# Patient Record
Sex: FEMALE | Race: WHITE | NOT HISPANIC OR LATINO
[De-identification: names, ages, dates, MRNs, and addresses within clinical notes are randomized per-mention and may not be internally consistent; named-entity substitution may affect disease eponyms.]

---

## 2017-02-05 ENCOUNTER — RESULT REVIEW (OUTPATIENT)
Age: 37
End: 2017-02-05

## 2017-08-25 ENCOUNTER — APPOINTMENT (OUTPATIENT)
Dept: INTERNAL MEDICINE | Facility: CLINIC | Age: 37
End: 2017-08-25

## 2019-04-13 ENCOUNTER — TRANSCRIPTION ENCOUNTER (OUTPATIENT)
Age: 39
End: 2019-04-13

## 2019-07-09 ENCOUNTER — RESULT REVIEW (OUTPATIENT)
Age: 39
End: 2019-07-09

## 2019-07-09 ENCOUNTER — TRANSCRIPTION ENCOUNTER (OUTPATIENT)
Age: 39
End: 2019-07-09

## 2019-10-28 ENCOUNTER — APPOINTMENT (OUTPATIENT)
Dept: POPULATION HEALTH | Facility: CLINIC | Age: 39
End: 2019-10-28

## 2019-11-25 ENCOUNTER — APPOINTMENT (OUTPATIENT)
Dept: POPULATION HEALTH | Facility: CLINIC | Age: 39
End: 2019-11-25

## 2021-05-12 ENCOUNTER — TRANSCRIPTION ENCOUNTER (OUTPATIENT)
Age: 41
End: 2021-05-12

## 2022-11-14 ENCOUNTER — RESULT REVIEW (OUTPATIENT)
Age: 42
End: 2022-11-14

## 2024-02-13 ENCOUNTER — TRANSCRIPTION ENCOUNTER (OUTPATIENT)
Age: 44
End: 2024-02-13

## 2024-05-10 ENCOUNTER — NON-APPOINTMENT (OUTPATIENT)
Age: 44
End: 2024-05-10

## 2024-05-21 DIAGNOSIS — Q43.3 CONGENITAL MALFORMATIONS OF INTESTINAL FIXATION: ICD-10-CM

## 2024-05-21 DIAGNOSIS — Z86.59 PERSONAL HISTORY OF OTHER MENTAL AND BEHAVIORAL DISORDERS: ICD-10-CM

## 2024-05-21 DIAGNOSIS — Z86.79 PERSONAL HISTORY OF OTHER DISEASES OF THE CIRCULATORY SYSTEM: ICD-10-CM

## 2024-05-21 DIAGNOSIS — Z98.890 OTHER SPECIFIED POSTPROCEDURAL STATES: ICD-10-CM

## 2024-05-21 RX ORDER — ACETAMINOPHEN 325 MG/1
TABLET, FILM COATED ORAL
Refills: 0 | Status: ACTIVE | COMMUNITY

## 2024-05-21 RX ORDER — METHOCARBAMOL 500 MG/1
TABLET, FILM COATED ORAL
Refills: 0 | Status: ACTIVE | COMMUNITY

## 2024-05-21 RX ORDER — LORATADINE 5 MG
TABLET,CHEWABLE ORAL
Refills: 0 | Status: ACTIVE | COMMUNITY

## 2024-05-21 RX ORDER — LORAZEPAM 2 MG/1
TABLET ORAL
Refills: 0 | Status: ACTIVE | COMMUNITY

## 2024-06-04 ENCOUNTER — NON-APPOINTMENT (OUTPATIENT)
Age: 44
End: 2024-06-04

## 2024-06-11 PROBLEM — K91.858 COMPLICATIONS OF INTESTINAL POUCH: Status: ACTIVE | Noted: 2024-06-11

## 2024-06-11 NOTE — PHYSICAL EXAM
[No Rash or Lesion] : No rash or lesion [Alert] : alert [Oriented to Person] : oriented to person [Oriented to Place] : oriented to place [Oriented to Time] : oriented to time [de-identified] : [facility] [de-identified] : NC/AT, Anicteric [de-identified] : no c/c/e noted

## 2024-06-11 NOTE — DATA REVIEWED
[FreeTextEntry1] : history of intestinal malrotation status post [place] procedure S/p laparoscopic suture rectopexy on 3/5/24 for slow transit constipation- Dr. Kalyani Jang s/p resection of dilated anastomosis with ileorectal anastomosis 5/26/22 at [place] s/p laparoscopic near-total colectomy with ileosigmoid anastomosis for chronic constipation and colonic inertia on 6/24/21 6/21/2023- Flexible Sigmoidoscopy Findings: -The perianal and digital rectal examinations were normal. Pertinent negatives include normal sphincter tone. -There was evidence of a prior end-to-side ileo-rectal anastomosis in the proximal rectum. This was patent and was characterized by healthy appearing mucosa. The anastomosis was traversed. Biopsies were taken with a cold forceps for histology. -Normal small bowel mucosa was found at 44cm proximal to the anus. Biopsies were taken with a cold forceps for histology. -Non-bleeding internal hemorrhoids were found during retroflexion. The hemorrhoids were grade 1 (internal hemorrhoids that do not prolapse).  Impression: -Patent end-to-side ileo-rectal anastomosis, characterized by healthy appearing mucosa. Biopsied. -Normal mucosa at 44 cm proximal to the anus. Biopsied. -Non-bleeding internal hemorrhoids.  Pathology: Diagnosis: A. Random small bowel at 40cm, biopsy: -Small intestinal mucosa with no significant pathologic diagnosis B. Ileorectal anastomosis, biopsy: -Benign small and large intestinal-type mucosa with no significant pathologic alteration  2/12/2024- Gastrografin Enema FINDINGS:  film of the abdomen demonstrates an unremarkable bowel gas pattern. Contrast was instilled via gravity in a retrograde fashion through the rectum and fluoroscopic images were obtained. There is free flow of contrast through the rectum and sigmoid colon. There is free flow of contrast through the ileocolic anastomosis and into the distal small bowel without holdup of contrast at the anastomosis. There is question of a small ileocolic anastomotic structure with less than 50% stenosis versus post normal surgical appearance. Distal small bowel appears mildly dilated, measuring up to 3.9 cm. There is no evidence for anastomotic leak. Postevacuation radiograph demonstrates an unremarkable bowel gas pattern with a small amount of residual contrast in the colon and distal small bowel. IMPRESSION: Question of small ileocolic anastomotic structure with less than 50% stenosis versus post normal surgical appearance. Free flow of contrast through the ileocolic anastomosis into the distal small bowel; without holdup of contrast at the anastomosis. Distal small bowel appears mildly dilated measuring up to 3.9 cm.

## 2024-06-17 ENCOUNTER — APPOINTMENT (OUTPATIENT)
Dept: COLORECTAL SURGERY | Facility: CLINIC | Age: 44
End: 2024-06-17

## 2024-06-17 DIAGNOSIS — K91.858 OTHER COMPLICATIONS OF INTESTINAL POUCH: ICD-10-CM

## 2024-06-20 ENCOUNTER — APPOINTMENT (OUTPATIENT)
Dept: TRANSPLANT | Facility: CLINIC | Age: 44
End: 2024-06-20
Payer: MEDICAID

## 2024-06-20 PROCEDURE — 99205 OFFICE O/P NEW HI 60 MIN: CPT

## 2024-06-21 NOTE — REVIEW OF SYSTEMS
[Abdominal Pain] : abdominal pain [Constipation] : constipation [Dysuria] : dysuria [Fever] : no fever [Chills] : no chills [Pain/Stiffness] : no pain/stiffness [Chest Pain] : no chest pain [SOB] : no shortness of breath [Pleuritic Chest Pain] : no pleuritic chest pain [Nausea] : no nausea [Vomiting] : no vomiting [Frequency] : no frequency [Hematuria] : no hematuria

## 2024-06-21 NOTE — ASSESSMENT
EMERGENCY DEPARTMENT ENCOUNTER     [facility]     Pt Name: Lady Rock   MRN: [id number]   Birthdate 1990   Date of evaluation: 1/21/2024   Provider: Jeff Hull MD   PCP: [facility] (Inactive)   Note Started: 5:41 PM EST 1/21/24     CHIEF COMPLAINT     Chief Complaint   Patient presents with    Concern For COVID-19        HISTORY OF PRESENT ILLNESS:  History from : Patient   Limitations to history : None     Lady Rock is a 33 y.o. female who presents with URI symptoms and concern for COVID.    This is a pleasant 33-year-old female with migratory past medical history who comes in with viral URI symptoms headache and concern for COVID.  Has had a cough which is nonproductive no objective history of fevers or chills and no history of ill contacts    Nursing Notes were all reviewed and agreed with or any disagreements were addressed in the HPI.     ROS: Positives and Pertinent negatives as per HPI.    PAST MEDICAL HISTORY     Past medical history:  has no past medical history on file.    Past surgical history:  has no past surgical history on file.      PHYSICAL EXAM:  ED Triage Vitals [01/21/24 1409]   BP Temp Temp Source Pulse Respirations SpO2 Height Weight - Scale   (!) 111/47 98.3 °F (36.8 °C) Oral 77 17 100 % 1.651 m (5' 5\") 81.4 kg (179 lb 7.3 oz)        Physical Exam   General-well-developed female no acute distress.  HEENT-EOMI, PRL, throat is mildly erythematous without tonsillar hypertrophy or exudate.  TMs normal bilaterally.  She does have nasal congestion.  Lungs-clear to auscultation bilaterally.  CV-regular rate and rhythm with no murmurs rubs gallops.  Abdominal exam-benign.  Extremities-no evidence of edema.      DIAGNOSTIC RESULTS   LABS:   Labs Reviewed   COVID-19, RAPID   RAPID INFLUENZA A/B ANTIGENS   STREP SCREEN GROUP A THROAT   CULTURE, BETA STREP CONFIRM PLATES      When ordered only abnormal lab results are displayed. 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   I reviewed this study both independently and along with her.   Presenting w non-classic sx of NLRV syndrome (GIB, pain w defecation).  She is currently undergoing a w/u by colorectal surgery (MR defecography among other tests scheduled).  Should the symptoms persist after this workup is complete and there is still nothing clearly etiologic, would plan to proceed with venography.  All of this was discussed with Ms. Huerta.  Her questions were answered.

## 2024-06-21 NOTE — PHYSICAL EXAM
[Normocephalic] : normocephalic [Atraumatic] : atraumatic [Clear to Auscultation] : clear to auscultation [Breathing Comfortably on RA] : breathing comfortably on room air [Normal Rate] : normal rate [Regular Rhythm] : regular rhythm [Alert] : alert [Responds to Questions Appropriately] : responds to questions appropriately [Tremor] : tremor [Oriented] : oriented [Appropriate] : appropriate [TextBox_3] : thin appearing

## 2024-06-21 NOTE — HISTORY OF PRESENT ILLNESS
[TextBox_42] : 42yo F with malrotation sp [place] Procedure (2016 and 2019) and multiple procedures from chronic constipation and slow transit colonic inertia. Most recently (3/5/24) had laparoscopic suture rectopexy.   Main symptoms now include left sided lower abdominal pain, worsened with Valsalva.  Has significant abd pain with urination.  She also reports bloody stools.  Has struggled with migraines recently as well.    Pain worse with standing, making it difficult to stand.  Tylenol and Robaxan ameliorate symptoms.  She still has periods; however they are irregular.  Does have menorrhagia.     Otherwise, she has had periodic anasarca.  This is followed by her PCP.   PMHx: as above, ITP PSHx; complicated, multiple colon surgeries, breast augmentations  Meds: reviewed

## 2024-07-15 ENCOUNTER — NON-APPOINTMENT (OUTPATIENT)
Age: 44
End: 2024-07-15

## 2024-07-17 PROBLEM — I10 HYPERTENSION, UNSPECIFIED TYPE: Status: ACTIVE | Noted: 2024-05-10

## 2024-07-17 PROBLEM — R10.9 ABDOMINAL PAIN: Status: ACTIVE | Noted: 2024-07-17

## 2024-07-19 ENCOUNTER — APPOINTMENT (OUTPATIENT)
Dept: INTERVENTIONAL RADIOLOGY/VASCULAR | Facility: CLINIC | Age: 44
End: 2024-07-19
Payer: MEDICAID

## 2024-07-19 DIAGNOSIS — Z78.9 OTHER SPECIFIED HEALTH STATUS: ICD-10-CM

## 2024-07-19 DIAGNOSIS — R10.9 UNSPECIFIED ABDOMINAL PAIN: ICD-10-CM

## 2024-07-19 DIAGNOSIS — D69.3 IMMUNE THROMBOCYTOPENIC PURPURA: ICD-10-CM

## 2024-07-19 DIAGNOSIS — I87.1 COMPRESSION OF VEIN: ICD-10-CM

## 2024-07-19 DIAGNOSIS — Z01.818 ENCOUNTER FOR OTHER PREPROCEDURAL EXAMINATION: ICD-10-CM

## 2024-07-19 DIAGNOSIS — F17.200 NICOTINE DEPENDENCE, UNSPECIFIED, UNCOMPLICATED: ICD-10-CM

## 2024-07-19 DIAGNOSIS — I10 ESSENTIAL (PRIMARY) HYPERTENSION: ICD-10-CM

## 2024-07-19 PROCEDURE — 99203 OFFICE O/P NEW LOW 30 MIN: CPT

## 2024-07-23 ENCOUNTER — APPOINTMENT (OUTPATIENT)
Dept: COLORECTAL SURGERY | Facility: CLINIC | Age: 44
End: 2024-07-23

## 2024-07-23 NOTE — HISTORY OF PRESENT ILLNESS
[FreeTextEntry1] : 43-year-old female pt her for rectal bleeding. Pt has a history of intestinal malrotation status post [place] procedure S/p laparoscopic suture rectopexy on 3/5/24 for slow transit constipation- Dr. Kalyani Jang s/p resection of dilated anastomosis with ileorectal anastomosis 5/26/22 at [facility] [place] s/p laparoscopic near-total colectomy with ileosigmoid anastomosis for chronic constipation and colonic inertia on 6/24/21   CT abdomen and pelvis 5/9/24 [place] No evidence of bowel obstruction or other acute pathology  MR Defecography 2/6/24 Impression: Global pelvic floor relaxation/weakness under valsalva, worse during defecation, with tricompartmental organ prolapse Difficulty in defecation due to prolapse with moderate anrorectal descent and anterior rectocele, without intussusception or full thickness prolapse Moderate bladder prolapse/cystocele and urethral hypermobility  under, without urinary incontinece Moderate uterovaginal prolapse Excellent, hyperdynamic levator ani/puborectalis sling contraction during kegel maneuver.  Colonoscopy 6/21/23- [facility] for rectal bleeding Impression: Patent end-to-side ileo-rectal anastomosis, characterized by healthy appearing mucosa, biopsied Normal mucosa at 44 cm proximal to the anus biopsies Non bleeding hemorrhoids

## 2024-07-23 NOTE — HISTORY OF PRESENT ILLNESS
[FreeTextEntry1] : 43-year-old female pt her for rectal bleeding. Pt has a history of intestinal malrotation status post [place] procedure S/p laparoscopic suture rectopexy on 3/5/24 for slow transit constipation- Dr. Kalyani Jang s/p resection of dilated anastomosis with ileorectal anastomosis 5/26/22 at [place] s/p laparoscopic near-total colectomy with ileosigmoid anastomosis for chronic constipation and colonic inertia on 6/24/21   CT abdomen and pelvis 5/9/24 [place] No evidence of bowel obstruction or other acute pathology  MR Defecography 2/6/24 Impression: Global pelvic floor relaxation/weakness under valsalva, worse during defecation, with tricompartmental organ prolapse Difficulty in defecation due to prolapse with moderate anrorectal descent and anterior rectocele, without intussusception or full thickness prolapse Moderate bladder prolapse/cystocele and urethral hypermobility  under, without urinary incontinece Moderate uterovaginal prolapse Excellent, hyperdynamic levator ani/puborectalis sling contraction during kegel maneuver.  Colonoscopy 6/21/23- [facility] for rectal bleeding Impression: Patent end-to-side ileo-rectal anastomosis, characterized by healthy appearing mucosa, biopsied Normal mucosa at 44 cm proximal to the anus biopsies Non bleeding hemorrhoids

## 2024-08-14 ENCOUNTER — RESULT REVIEW (OUTPATIENT)
Age: 44
End: 2024-08-14

## 2024-08-14 ENCOUNTER — TRANSCRIPTION ENCOUNTER (OUTPATIENT)
Age: 44
End: 2024-08-14

## 2024-08-22 ENCOUNTER — APPOINTMENT (OUTPATIENT)
Dept: TRANSPLANT | Facility: CLINIC | Age: 44
End: 2024-08-22
Payer: MEDICAID

## 2024-08-22 PROCEDURE — 99214 OFFICE O/P EST MOD 30 MIN: CPT

## 2024-08-23 NOTE — HISTORY OF PRESENT ILLNESS
[TextBox_42] : 44yo F with malrotation sp [place] Procedure (2016 and 2019) and multiple procedures from chronic constipation and slow transit colonic inertia. Most recently (3/5/24) had laparoscopic suture rectopexy.   Main symptoms now include left sided lower abdominal pain, worsened with Valsalva.  Has significant abd pain with urination.  She also reports bloody stools.  Has struggled with migraines recently as well.    Pain worse with standing, making it difficult to stand.  Tylenol and Robaxan ameliorate symptoms.  She still has periods; however they are irregular.  Does have menorrhagia.     Otherwise, she has had periodic anasarca.  This is followed by her PCP.   PMHx: as above, ITP PSHx; complicated, multiple colon surgeries, breast augmentations  Meds: reviewed   [de-identified] : 8/22/24 - having continued pain generalized abd pain, worse with deep inspiration, difficult to take deep breaths.   Significant pain w valsalva.  Feels more and more fatigue and less activity.  Reports menorrhagia.   She is getting q week IV fluids.   No gross hematuria.

## 2024-08-23 NOTE — ASSESSMENT
[FreeTextEntry1] : 42yo F with NLRV syndrome.  w/u completed w venography.  Case d/w her CR surgery team.   We discussed that though NLRV may be contributing to many of her symptoms, it is likely not the sole etiology.  We reviewed her venography together and demonstrated the pelvic reflux.  We discussed that autotxp would fix this and its related symptoms.  We discussed the risks of autotxp (bleeding, infection, urine leak, ureteral stricture, vascular thrombosis, vascular stenosis) in depth.   She will discuss the next steps with her partner and be in touch.  Contact information provided.  Questions answered.

## 2024-08-23 NOTE — HISTORY OF PRESENT ILLNESS
[TextBox_42] : 42yo F with malrotation sp [place] Procedure (2016 and 2019) and multiple procedures from chronic constipation and slow transit colonic inertia. Most recently (3/5/24) had laparoscopic suture rectopexy.   Main symptoms now include left sided lower abdominal pain, worsened with Valsalva.  Has significant abd pain with urination.  She also reports bloody stools.  Has struggled with migraines recently as well.    Pain worse with standing, making it difficult to stand.  Tylenol and Robaxan ameliorate symptoms.  She still has periods; however they are irregular.  Does have menorrhagia.     Otherwise, she has had periodic anasarca.  This is followed by her PCP.   PMHx: as above, ITP PSHx; complicated, multiple colon surgeries, breast augmentations  Meds: reviewed   [de-identified] : 8/22/24 - having continued pain generalized abd pain, worse with deep inspiration, difficult to take deep breaths.   Significant pain w valsalva.  Feels more and more fatigue and less activity.  Reports menorrhagia.   She is getting q week IV fluids.   No gross hematuria.

## 2024-08-23 NOTE — ASSESSMENT
[FreeTextEntry1] : 44yo F with NLRV syndrome.  w/u completed w venography.  Case d/w her CR surgery team.   We discussed that though NLRV may be contributing to many of her symptoms, it is likely not the sole etiology.  We reviewed her venography together and demonstrated the pelvic reflux.  We discussed that autotxp would fix this and its related symptoms.  We discussed the risks of autotxp (bleeding, infection, urine leak, ureteral stricture, vascular thrombosis, vascular stenosis) in depth.   She will discuss the next steps with her partner and be in touch.  Contact information provided.  Questions answered.

## 2024-08-27 DIAGNOSIS — I87.1 COMPRESSION OF VEIN: ICD-10-CM

## 2024-09-06 ENCOUNTER — RESULT REVIEW (OUTPATIENT)
Age: 44
End: 2024-09-06

## 2024-10-11 ENCOUNTER — OUTPATIENT (OUTPATIENT)
Dept: OUTPATIENT SERVICES | Facility: HOSPITAL | Age: 44
LOS: 1 days | End: 2024-10-11
Payer: MEDICAID

## 2024-10-11 VITALS
TEMPERATURE: 98 F | HEIGHT: 63 IN | OXYGEN SATURATION: 99 % | DIASTOLIC BLOOD PRESSURE: 72 MMHG | HEART RATE: 89 BPM | WEIGHT: 110.89 LBS | SYSTOLIC BLOOD PRESSURE: 104 MMHG | RESPIRATION RATE: 14 BRPM

## 2024-10-11 DIAGNOSIS — J34.2 DEVIATED NASAL SEPTUM: Chronic | ICD-10-CM

## 2024-10-11 DIAGNOSIS — I87.1 COMPRESSION OF VEIN: ICD-10-CM

## 2024-10-11 DIAGNOSIS — Z98.86 PERSONAL HISTORY OF BREAST IMPLANT REMOVAL: Chronic | ICD-10-CM

## 2024-10-11 DIAGNOSIS — Z98.890 OTHER SPECIFIED POSTPROCEDURAL STATES: Chronic | ICD-10-CM

## 2024-10-11 DIAGNOSIS — Z01.818 ENCOUNTER FOR OTHER PREPROCEDURAL EXAMINATION: ICD-10-CM

## 2024-10-11 DIAGNOSIS — Z98.89 OTHER SPECIFIED POSTPROCEDURAL STATES: Chronic | ICD-10-CM

## 2024-10-11 DIAGNOSIS — Z98.82 BREAST IMPLANT STATUS: Chronic | ICD-10-CM

## 2024-10-11 LAB
ANION GAP SERPL CALC-SCNC: 10 MMOL/L — SIGNIFICANT CHANGE UP (ref 5–17)
BLD GP AB SCN SERPL QL: NEGATIVE — SIGNIFICANT CHANGE UP
BUN SERPL-MCNC: 6 MG/DL — LOW (ref 7–23)
CALCIUM SERPL-MCNC: 9.2 MG/DL — SIGNIFICANT CHANGE UP (ref 8.4–10.5)
CHLORIDE SERPL-SCNC: 101 MMOL/L — SIGNIFICANT CHANGE UP (ref 96–108)
CO2 SERPL-SCNC: 25 MMOL/L — SIGNIFICANT CHANGE UP (ref 22–31)
CREAT SERPL-MCNC: 0.7 MG/DL — SIGNIFICANT CHANGE UP (ref 0.5–1.3)
EGFR: 109 ML/MIN/1.73M2 — SIGNIFICANT CHANGE UP
GLUCOSE SERPL-MCNC: 100 MG/DL — HIGH (ref 70–99)
HCT VFR BLD CALC: 43 % — SIGNIFICANT CHANGE UP (ref 34.5–45)
HGB BLD-MCNC: 14.2 G/DL — SIGNIFICANT CHANGE UP (ref 11.5–15.5)
MCHC RBC-ENTMCNC: 30.3 PG — SIGNIFICANT CHANGE UP (ref 27–34)
MCHC RBC-ENTMCNC: 33 GM/DL — SIGNIFICANT CHANGE UP (ref 32–36)
MCV RBC AUTO: 91.9 FL — SIGNIFICANT CHANGE UP (ref 80–100)
NRBC # BLD: 0 /100 WBCS — SIGNIFICANT CHANGE UP (ref 0–0)
PLATELET # BLD AUTO: 239 K/UL — SIGNIFICANT CHANGE UP (ref 150–400)
POTASSIUM SERPL-MCNC: 4.5 MMOL/L — SIGNIFICANT CHANGE UP (ref 3.5–5.3)
POTASSIUM SERPL-SCNC: 4.5 MMOL/L — SIGNIFICANT CHANGE UP (ref 3.5–5.3)
RBC # BLD: 4.68 M/UL — SIGNIFICANT CHANGE UP (ref 3.8–5.2)
RBC # FLD: 14.6 % — HIGH (ref 10.3–14.5)
RH IG SCN BLD-IMP: POSITIVE — SIGNIFICANT CHANGE UP
SODIUM SERPL-SCNC: 136 MMOL/L — SIGNIFICANT CHANGE UP (ref 135–145)
WBC # BLD: 8.56 K/UL — SIGNIFICANT CHANGE UP (ref 3.8–10.5)
WBC # FLD AUTO: 8.56 K/UL — SIGNIFICANT CHANGE UP (ref 3.8–10.5)

## 2024-10-11 PROCEDURE — 85027 COMPLETE CBC AUTOMATED: CPT

## 2024-10-11 PROCEDURE — 86900 BLOOD TYPING SEROLOGIC ABO: CPT

## 2024-10-11 PROCEDURE — 87086 URINE CULTURE/COLONY COUNT: CPT

## 2024-10-11 PROCEDURE — 80048 BASIC METABOLIC PNL TOTAL CA: CPT

## 2024-10-11 PROCEDURE — 86901 BLOOD TYPING SEROLOGIC RH(D): CPT

## 2024-10-11 PROCEDURE — 86850 RBC ANTIBODY SCREEN: CPT

## 2024-10-11 PROCEDURE — G0463: CPT

## 2024-10-11 PROCEDURE — 36415 COLL VENOUS BLD VENIPUNCTURE: CPT

## 2024-10-11 NOTE — H&P PST ADULT - NSHP PST SURGERY DATE_DT_GEN_A_CORE
Caller: Patricia Brown    Relationship: Self    Best call back number: [phone]     Caller requesting test results: SELF    What test was performed: X-RAY HIP    When was the test performed: 07.08.24    Where was the test performed: IN OFFICE    Additional notes: THE PATIENT WOULD LIKE A CALL ASAP TO GET THESE RESULTS.    31-Oct-2024

## 2024-10-11 NOTE — H&P PST ADULT - NSICDXPASTMEDICALHX_GEN_ALL_CORE_FT
PAST MEDICAL HISTORY:  Acid reflux     ADD (attention deficit disorder)     Adrenal disease     Anemia     Dehydration     Hiatal hernia     Hyperlipidemia     Intestinal malrotation     Nutcracker phenomenon of renal vein     Palpitations     Pre-diabetes     Seizure disorder States it's "hormonal"  related to dehydration. Last episode 2014    Thyroid disease As per patient both "hyper and hypo"    TIA (transient ischemic attack) Multiple -- "hormonal" Last episode 2014     PAST MEDICAL HISTORY:  Acid reflux     ADD (attention deficit disorder)     Adrenal disease     Anemia     Dehydration     Hiatal hernia     History of ITP     Hyperlipidemia     Intestinal malrotation     Nutcracker phenomenon of renal vein     Palpitations     Pre-diabetes     Seizure disorder States it's "hormonal"  related to dehydration. Last episode 2014    Thyroid disease As per patient both "hyper and hypo"    TIA (transient ischemic attack) Multiple -- "hormonal" Last episode 2014

## 2024-10-11 NOTE — H&P PST ADULT - ASSESSMENT
DASI score: 8  DASI activity: exercise 3x/week-pilates, weights, able to walk up 5 flights of stairs   Loose teeth or denture: denies     CAPRINI SCORE    AGE RELATED RISK FACTORS                                                             [ ] Age 41-60 years                                            (1 Point)  [ ] Age: 61-74 years                                           (2 Points)                 [ ] Age= 75 years                                                (3 Points)             DISEASE RELATED RISK FACTORS                                                       [ ] Edema in the lower extremities                 (1 Point)                     [ ] Varicose veins                                               (1 Point)                                 [ ] BMI > 25 Kg/m2                                            (1 Point)                                  [ ] Serious infection (ie PNA)                            (1 Point)                     [ ] Lung disease ( COPD, Emphysema)            (1 Point)                                                                          [ ] Acute myocardial infarction                         (1 Point)                  [ ] Congestive heart failure (in the previous month)  (1 Point)         [ ] Inflammatory bowel disease                            (1 Point)                  [ ] Central venous access, PICC or Port               (2 points)       (within the last month)                                                                [ ] Stroke (in the previous month)                        (5 Points)    [ ] Previous or present malignancy                       (2 points)                                                                                                                                                         HEMATOLOGY RELATED FACTORS                                                         [ ] Prior episodes of VTE                                     (3 Points)                     [ ] Positive family history for VTE                      (3 Points)                  [ ] Prothrombin 28336 A                                     (3 Points)                     [ ] Factor V Leiden                                                (3 Points)                        [ ] Lupus anticoagulants                                      (3 Points)                                                           [ ] Anticardiolipin antibodies                              (3 Points)                                                       [ ] High homocysteine in the blood                   (3 Points)                                             [ ] Other congenital or acquired thrombophilia      (3 Points)                                                [ ] Heparin induced thrombocytopenia                  (3 Points)                                        MOBILITY RELATED FACTORS  [ ] Bed rest                                                         (1 Point)  [ ] Plaster cast                                                    (2 points)  [ ] Bed bound for more than 72 hours           (2 Points)    GENDER SPECIFIC FACTORS  [ ] Pregnancy or had a baby within the last month   (1 Point)  [ ] Post-partum < 6 weeks                                   (1 Point)  [ ] Hormonal therapy  or oral contraception   (1 Point)  [ ] History of pregnancy complications              (1 point)  [ ] Unexplained or recurrent              (1 Point)    OTHER RISK FACTORS                                           (1 Point)  [ ] BMI >40, smoking, diabetes requiring insulin, chemotherapy  blood transfusions and length of surgery over 2 hours    SURGERY RELATED RISK FACTORS  [ ]  Section within the last month     (1 Point)  [ ] Minor surgery                                                  (1 Point)  [ ] Arthroscopic surgery                                       (2 Points)  [ ] Planned major surgery lasting more            (2 Points)      than 45 minutes     [ ] Elective hip or knee joint replacement       (5 points)       surgery                                                TRAUMA RELATED RISK FACTORS  [ ] Fracture of the hip, pelvis, or leg                       (5 Points)  [ ] Spinal cord injury resulting in paralysis             (5 points)       (in the previous month)    [ ] Paralysis  (less than 1 month)                             (5 Points)  [ ] Multiple Trauma within 1 month                        (5 Points)    Total Score [        ]    Caprini Score 0-2: Low Risk, NO VTE prophylaxis required for most patients, encourage ambulation  Caprini Score 3-6: Moderate Risk , pharmacologic VTE prophylaxis is indicated for most patients (in the absence of contraindications)  Caprini Score Greater than or =7: High risk, pharmocologic VTE prophylaxis indicated for most patients (in the absence of contraindications)                               DASI score: 8  DASI activity: exercise 3x/week-pilates, weights, able to walk up 5 flights of stairs   Loose teeth or denture: denies     CAPRINI SCORE    AGE RELATED RISK FACTORS                                                             [x ] Age 41-60 years                                            (1 Point)  [ ] Age: 61-74 years                                           (2 Points)                 [ ] Age= 75 years                                                (3 Points)             DISEASE RELATED RISK FACTORS                                                       [ ] Edema in the lower extremities                 (1 Point)                     [ ] Varicose veins                                               (1 Point)                                 [ ] BMI > 25 Kg/m2                                            (1 Point)                                  [ ] Serious infection (ie PNA)                            (1 Point)                     [ ] Lung disease ( COPD, Emphysema)            (1 Point)                                                                          [ ] Acute myocardial infarction                         (1 Point)                  [ ] Congestive heart failure (in the previous month)  (1 Point)         [ ] Inflammatory bowel disease                            (1 Point)                  [ ] Central venous access, PICC or Port               (2 points)       (within the last month)                                                                [ ] Stroke (in the previous month)                        (5 Points)    [ ] Previous or present malignancy                       (2 points)                                                                                                                                                         HEMATOLOGY RELATED FACTORS                                                         [ ] Prior episodes of VTE                                     (3 Points)                     [ ] Positive family history for VTE                      (3 Points)                  [ ] Prothrombin 48372 A                                     (3 Points)                     [ ] Factor V Leiden                                                (3 Points)                        [ ] Lupus anticoagulants                                      (3 Points)                                                           [ ] Anticardiolipin antibodies                              (3 Points)                                                       [ ] High homocysteine in the blood                   (3 Points)                                             [ ] Other congenital or acquired thrombophilia      (3 Points)                                                [ ] Heparin induced thrombocytopenia                  (3 Points)                                        MOBILITY RELATED FACTORS  [ ] Bed rest                                                         (1 Point)  [ ] Plaster cast                                                    (2 points)  [ ] Bed bound for more than 72 hours           (2 Points)    GENDER SPECIFIC FACTORS  [ ] Pregnancy or had a baby within the last month   (1 Point)  [ ] Post-partum < 6 weeks                                   (1 Point)  [ ] Hormonal therapy  or oral contraception   (1 Point)  [ ] History of pregnancy complications              (1 point)  [ ] Unexplained or recurrent              (1 Point)    OTHER RISK FACTORS                                           (1 Point)  [ ] BMI >40, smoking, diabetes requiring insulin, chemotherapy  blood transfusions and length of surgery over 2 hours    SURGERY RELATED RISK FACTORS  [ ]  Section within the last month     (1 Point)  [ ] Minor surgery                                                  (1 Point)  [ ] Arthroscopic surgery                                       (2 Points)  [ x] Planned major surgery lasting more            (2 Points)      than 45 minutes     [ ] Elective hip or knee joint replacement       (5 points)       surgery                                                TRAUMA RELATED RISK FACTORS  [ ] Fracture of the hip, pelvis, or leg                       (5 Points)  [ ] Spinal cord injury resulting in paralysis             (5 points)       (in the previous month)    [ ] Paralysis  (less than 1 month)                             (5 Points)  [ ] Multiple Trauma within 1 month                        (5 Points)    Total Score [  3      ]    Caprini Score 0-2: Low Risk, NO VTE prophylaxis required for most patients, encourage ambulation  Caprini Score 3-6: Moderate Risk , pharmacologic VTE prophylaxis is indicated for most patients (in the absence of contraindications)  Caprini Score Greater than or =7: High risk, pharmocologic VTE prophylaxis indicated for most patients (in the absence of contraindications)

## 2024-10-11 NOTE — H&P PST ADULT - NSICDXPASTSURGICALHX_GEN_ALL_CORE_FT
PAST SURGICAL HISTORY:  Deviated septum s/p repair 1997    H/O breast surgery x 2 revision of augmentation 2006    H/O hernia repair     H/O of rectopexy     S/P appendectomy     S/P breast augmentation with Saline 2006    S/P cholecystectomy     S/P [place] procedure     S/P total colectomy      PAST SURGICAL HISTORY:  Deviated septum s/p repair 1997    H/O breast surgery x 2 revision of augmentation 2006    H/O hernia repair     H/O of rectopexy     History of breast implant removal     S/P appendectomy     S/P breast augmentation with Saline 2006    S/P cholecystectomy     S/P [place] procedure     S/P total colectomy

## 2024-10-11 NOTE — H&P PST ADULT - HISTORY OF PRESENT ILLNESS
42yo F with malrotation sp [place] Procedure (2016 and 2019) and multiple procedures from chronic constipation and slow transit colonic inertia. Most recently (3/5/24) had laparoscopic suture rectopexy. Main symptoms now include left sided lower abdominal pain, worsened with Valsalva. Has significant abd pain with urination. She also reports bloody stools. Has struggled with migraines recently as well. Pain worse with standing, making it difficult to stand. Tylenol and Robaxan ameliorate symptoms. evaluated by Dr. Humphrey, diagnosed with nuckcracker syndrome, now presents to PST scheduled for autotransplant surgery for nutcracker syndrome on 10/31.    44yo Female. ADD, h/o adrenal disease (that resolved), ITP (diagnosed years ago, platelet count has normalized since then),  with malrotation sp [place] Procedure (2016 and 2019) and multiple procedures from chronic constipation and slow transit colonic inertia. Most recently (3/5/24) had laparoscopic suture rectopexy. Main symptoms now include left sided lower abdominal pain, worsened with Valsalva. Has significant abd pain with urination. She also reports bloody stools. Has struggled with migraines recently as well. Pain worse with standing, making it difficult to stand. Tylenol and Robaxan ameliorate symptoms. evaluated by Dr. Humphrey, diagnosed with nuckcracker syndrome, now presents to [facility] scheduled for autotransplant surgery for nutcracker syndrome on 10/31.

## 2024-10-12 LAB
CULTURE RESULTS: NO GROWTH — SIGNIFICANT CHANGE UP
SPECIMEN SOURCE: SIGNIFICANT CHANGE UP

## 2024-10-31 ENCOUNTER — APPOINTMENT (OUTPATIENT)
Dept: TRANSPLANT | Facility: HOSPITAL | Age: 44
End: 2024-10-31

## 2025-02-06 ENCOUNTER — APPOINTMENT (OUTPATIENT)
Dept: COLORECTAL SURGERY | Facility: CLINIC | Age: 45
End: 2025-02-06
Payer: MEDICAID

## 2025-02-06 DIAGNOSIS — K59.02 OUTLET DYSFUNCTION CONSTIPATION: ICD-10-CM

## 2025-02-06 DIAGNOSIS — R10.9 UNSPECIFIED ABDOMINAL PAIN: ICD-10-CM

## 2025-02-06 DIAGNOSIS — I87.1 COMPRESSION OF VEIN: ICD-10-CM

## 2025-02-06 DIAGNOSIS — M62.89 OTHER SPECIFIED DISORDERS OF MUSCLE: ICD-10-CM

## 2025-02-06 PROBLEM — Z86.2 PERSONAL HISTORY OF DISEASES OF THE BLOOD AND BLOOD-FORMING ORGANS AND CERTAIN DISORDERS INVOLVING THE IMMUNE MECHANISM: Chronic | Status: ACTIVE | Noted: 2024-10-11

## 2025-02-06 PROCEDURE — 99205 OFFICE O/P NEW HI 60 MIN: CPT | Mod: 95

## 2025-04-03 ENCOUNTER — APPOINTMENT (OUTPATIENT)
Dept: TRANSPLANT | Facility: CLINIC | Age: 45
End: 2025-04-03
Payer: MEDICAID

## 2025-04-03 DIAGNOSIS — I87.1 COMPRESSION OF VEIN: ICD-10-CM

## 2025-04-03 PROCEDURE — 99214 OFFICE O/P EST MOD 30 MIN: CPT | Mod: 95

## 2025-04-08 DIAGNOSIS — Z00.5 ENCOUNTER FOR EXAMINATION OF POTENTIAL DONOR OF ORGAN AND TISSUE: ICD-10-CM

## 2025-04-16 ENCOUNTER — APPOINTMENT (OUTPATIENT)
Dept: NEPHROLOGY | Facility: CLINIC | Age: 45
End: 2025-04-16

## 2025-04-16 ENCOUNTER — APPOINTMENT (OUTPATIENT)
Dept: TRANSPLANT | Facility: CLINIC | Age: 45
End: 2025-04-16

## 2025-04-17 ENCOUNTER — APPOINTMENT (OUTPATIENT)
Dept: COLORECTAL SURGERY | Facility: CLINIC | Age: 45
End: 2025-04-17
Payer: MEDICAID

## 2025-04-17 ENCOUNTER — APPOINTMENT (OUTPATIENT)
Dept: NUCLEAR MEDICINE | Facility: HOSPITAL | Age: 45
End: 2025-04-17

## 2025-04-17 DIAGNOSIS — I87.1 COMPRESSION OF VEIN: ICD-10-CM

## 2025-04-17 DIAGNOSIS — R10.9 UNSPECIFIED ABDOMINAL PAIN: ICD-10-CM

## 2025-04-17 DIAGNOSIS — K59.02 OUTLET DYSFUNCTION CONSTIPATION: ICD-10-CM

## 2025-04-17 DIAGNOSIS — M62.89 OTHER SPECIFIED DISORDERS OF MUSCLE: ICD-10-CM

## 2025-04-17 PROCEDURE — 99213 OFFICE O/P EST LOW 20 MIN: CPT | Mod: 95

## 2025-04-23 ENCOUNTER — APPOINTMENT (OUTPATIENT)
Dept: NUCLEAR MEDICINE | Facility: HOSPITAL | Age: 45
End: 2025-04-23

## 2025-04-30 ENCOUNTER — APPOINTMENT (OUTPATIENT)
Dept: TRANSPLANT | Facility: CLINIC | Age: 45
End: 2025-04-30
Payer: MEDICAID

## 2025-04-30 DIAGNOSIS — I87.1 COMPRESSION OF VEIN: ICD-10-CM

## 2025-04-30 PROCEDURE — 99214 OFFICE O/P EST MOD 30 MIN: CPT

## 2025-05-06 ENCOUNTER — OUTPATIENT (OUTPATIENT)
Dept: OUTPATIENT SERVICES | Facility: HOSPITAL | Age: 45
LOS: 1 days | End: 2025-05-06
Payer: MEDICAID

## 2025-05-06 DIAGNOSIS — Z98.89 OTHER SPECIFIED POSTPROCEDURAL STATES: Chronic | ICD-10-CM

## 2025-05-06 DIAGNOSIS — J34.2 DEVIATED NASAL SEPTUM: Chronic | ICD-10-CM

## 2025-05-06 DIAGNOSIS — Z98.890 OTHER SPECIFIED POSTPROCEDURAL STATES: Chronic | ICD-10-CM

## 2025-05-06 DIAGNOSIS — Z98.86 PERSONAL HISTORY OF BREAST IMPLANT REMOVAL: Chronic | ICD-10-CM

## 2025-05-06 DIAGNOSIS — Z98.82 BREAST IMPLANT STATUS: Chronic | ICD-10-CM

## 2025-05-06 DIAGNOSIS — I87.1 COMPRESSION OF VEIN: ICD-10-CM

## 2025-05-06 LAB
ALBUMIN SERPL ELPH-MCNC: 4.5 G/DL
ALP BLD-CCNC: 71 U/L
ALT SERPL-CCNC: 7 U/L
ANION GAP SERPL CALC-SCNC: 14 MMOL/L
APPEARANCE: CLEAR
AST SERPL-CCNC: 18 U/L
BACTERIA: NEGATIVE /HPF
BASOPHILS # BLD AUTO: 0.08 K/UL
BASOPHILS NFR BLD AUTO: 0.7 %
BILIRUB SERPL-MCNC: 0.2 MG/DL
BILIRUBIN URINE: NEGATIVE
BLOOD URINE: NEGATIVE
BUN SERPL-MCNC: 4 MG/DL
CALCIUM SERPL-MCNC: 9.3 MG/DL
CAST: 0 /LPF
CHLORIDE SERPL-SCNC: 101 MMOL/L
CO2 SERPL-SCNC: 22 MMOL/L
COLOR: YELLOW
CREAT 24H UR-MCNC: 0.9 G/24 H
CREAT ?TM UR-MCNC: 43 MG/DL
CREAT SERPL-MCNC: 0.73 MG/DL
CREAT SPEC-SCNC: 18 MG/DL
CREAT/PROT UR: NORMAL RATIO
CYSTATIN C SERPL-MCNC: 0.85 MG/L
EGFRCR SERPLBLD CKD-EPI 2021: 103 ML/MIN/1.73M2
EOSINOPHIL # BLD AUTO: 0.07 K/UL
EOSINOPHIL NFR BLD AUTO: 0.6 %
EPITHELIAL CELLS: 1 /HPF
GFR/BSA.PRED SERPLBLD CYS-BASED-ARV: 95 ML/MIN/1.73M2
GLUCOSE QUALITATIVE U: NEGATIVE MG/DL
GLUCOSE SERPL-MCNC: 75 MG/DL
HCT VFR BLD CALC: 41.4 %
HGB BLD-MCNC: 13.9 G/DL
IMM GRANULOCYTES NFR BLD AUTO: 0.2 %
INR PPP: 1.01 RATIO
KETONES URINE: NEGATIVE MG/DL
LEUKOCYTE ESTERASE URINE: NEGATIVE
LYMPHOCYTES # BLD AUTO: 2.08 K/UL
LYMPHOCYTES NFR BLD AUTO: 18.3 %
MAN DIFF?: NORMAL
MCHC RBC-ENTMCNC: 30.3 PG
MCHC RBC-ENTMCNC: 33.6 G/DL
MCV RBC AUTO: 90.4 FL
MICROALBUMIN 24H UR DL<=1MG/L-MCNC: <1.2 MG/DL
MICROALBUMIN 24H UR DL<=1MG/L-MRATE: NORMAL MG/24HR
MICROALBUMIN ?TM UR-MRATE: NORMAL UG/MIN
MICROSCOPIC-UA: NORMAL
MONOCYTES # BLD AUTO: 0.46 K/UL
MONOCYTES NFR BLD AUTO: 4 %
NEUTROPHILS # BLD AUTO: 8.67 K/UL
NEUTROPHILS NFR BLD AUTO: 76.2 %
NITRITE URINE: NEGATIVE
PH URINE: 6
PLATELET # BLD AUTO: 219 K/UL
POTASSIUM SERPL-SCNC: 4.9 MMOL/L
PROT 24H UR-MRATE: 5 MG/DL
PROT ?TM UR-MCNC: 24 HR
PROT SERPL-MCNC: 6.6 G/DL
PROT UR-MCNC: 102 MG/24 H
PROT UR-MCNC: <4 MG/DL
PROTEIN URINE: NEGATIVE MG/DL
PT BLD: 12 SEC
RBC # BLD: 4.58 M/UL
RBC # FLD: 14.1 %
RED BLOOD CELLS URINE: 0 /HPF
SODIUM 24H UR-SCNC: <20 MMOL/L
SODIUM 24H UR-SRATE: NORMAL MMOL/24HR
SODIUM SERPL-SCNC: 137 MMOL/L
SPECIFIC GRAVITY URINE: 1.01
SPECIMEN VOL 24H UR: 2050 ML
UROBILINOGEN URINE: 0.2 MG/DL
WBC # FLD AUTO: 11.38 K/UL
WHITE BLOOD CELLS URINE: 0 /HPF

## 2025-05-06 PROCEDURE — A9562: CPT

## 2025-05-06 PROCEDURE — 78707 K FLOW/FUNCT IMAGE W/O DRUG: CPT

## 2025-05-06 PROCEDURE — 78707 K FLOW/FUNCT IMAGE W/O DRUG: CPT | Mod: 26

## 2025-05-06 RX ADMIN — Medication 491 MILLILITER(S): at 09:07

## 2025-05-13 ENCOUNTER — APPOINTMENT (OUTPATIENT)
Dept: NEPHROLOGY | Facility: CLINIC | Age: 45
End: 2025-05-13
Payer: MEDICAID

## 2025-05-13 VITALS
TEMPERATURE: 97.3 F | WEIGHT: 108 LBS | SYSTOLIC BLOOD PRESSURE: 102 MMHG | DIASTOLIC BLOOD PRESSURE: 58 MMHG | HEIGHT: 63 IN | HEART RATE: 82 BPM | BODY MASS INDEX: 19.14 KG/M2 | OXYGEN SATURATION: 99 %

## 2025-05-13 DIAGNOSIS — I87.1 COMPRESSION OF VEIN: ICD-10-CM

## 2025-05-13 PROCEDURE — 99204 OFFICE O/P NEW MOD 45 MIN: CPT

## 2025-05-15 ENCOUNTER — APPOINTMENT (OUTPATIENT)
Dept: OBGYN | Facility: CLINIC | Age: 45
End: 2025-05-15
Payer: MEDICAID

## 2025-05-15 ENCOUNTER — APPOINTMENT (OUTPATIENT)
Dept: TRANSPLANT | Facility: CLINIC | Age: 45
End: 2025-05-15

## 2025-05-15 VITALS
WEIGHT: 108 LBS | HEART RATE: 74 BPM | BODY MASS INDEX: 19.14 KG/M2 | DIASTOLIC BLOOD PRESSURE: 66 MMHG | RESPIRATION RATE: 16 BRPM | SYSTOLIC BLOOD PRESSURE: 102 MMHG | TEMPERATURE: 98.2 F | HEIGHT: 63 IN | OXYGEN SATURATION: 99 %

## 2025-05-15 DIAGNOSIS — N94.6 DYSMENORRHEA, UNSPECIFIED: ICD-10-CM

## 2025-05-15 DIAGNOSIS — Z00.00 ENCOUNTER FOR GENERAL ADULT MEDICAL EXAMINATION W/OUT ABNORMAL FINDINGS: ICD-10-CM

## 2025-05-15 DIAGNOSIS — N81.11 CYSTOCELE, MIDLINE: ICD-10-CM

## 2025-05-15 DIAGNOSIS — R10.2 PELVIC AND PERINEAL PAIN: ICD-10-CM

## 2025-05-15 DIAGNOSIS — M62.89 OTHER SPECIFIED DISORDERS OF MUSCLE: ICD-10-CM

## 2025-05-15 DIAGNOSIS — N93.9 ABNORMAL UTERINE AND VAGINAL BLEEDING, UNSPECIFIED: ICD-10-CM

## 2025-05-15 LAB
BILIRUB UR QL STRIP: NORMAL
CLARITY UR: CLEAR
COLLECTION METHOD: NORMAL
GLUCOSE UR-MCNC: NORMAL
HCG UR QL: 0.2 EU/DL
HCG UR QL: NEGATIVE
HGB UR QL STRIP.AUTO: NORMAL
KETONES UR-MCNC: NORMAL
LEUKOCYTE ESTERASE UR QL STRIP: NORMAL
NITRITE UR QL STRIP: NORMAL
PH UR STRIP: 5.5
PROT UR STRIP-MCNC: NORMAL
QUALITY CONTROL: YES
SP GR UR STRIP: 1

## 2025-05-15 PROCEDURE — 99204 OFFICE O/P NEW MOD 45 MIN: CPT

## 2025-05-15 PROCEDURE — 81025 URINE PREGNANCY TEST: CPT

## 2025-05-15 PROCEDURE — 81003 URINALYSIS AUTO W/O SCOPE: CPT | Mod: QW

## 2025-05-15 PROCEDURE — 99459 PELVIC EXAMINATION: CPT

## 2025-05-15 PROCEDURE — 99214 OFFICE O/P EST MOD 30 MIN: CPT | Mod: 95

## 2025-05-16 ENCOUNTER — LABORATORY RESULT (OUTPATIENT)
Age: 45
End: 2025-05-16

## 2025-05-28 ENCOUNTER — OUTPATIENT (OUTPATIENT)
Dept: OUTPATIENT SERVICES | Facility: HOSPITAL | Age: 45
LOS: 1 days | End: 2025-05-28
Payer: MEDICAID

## 2025-05-28 VITALS
OXYGEN SATURATION: 100 % | RESPIRATION RATE: 16 BRPM | DIASTOLIC BLOOD PRESSURE: 70 MMHG | HEIGHT: 63 IN | SYSTOLIC BLOOD PRESSURE: 101 MMHG | WEIGHT: 108.03 LBS | HEART RATE: 90 BPM | TEMPERATURE: 99 F

## 2025-05-28 DIAGNOSIS — Z98.82 BREAST IMPLANT STATUS: Chronic | ICD-10-CM

## 2025-05-28 DIAGNOSIS — J34.2 DEVIATED NASAL SEPTUM: Chronic | ICD-10-CM

## 2025-05-28 DIAGNOSIS — Z98.89 OTHER SPECIFIED POSTPROCEDURAL STATES: Chronic | ICD-10-CM

## 2025-05-28 DIAGNOSIS — Z98.86 PERSONAL HISTORY OF BREAST IMPLANT REMOVAL: Chronic | ICD-10-CM

## 2025-05-28 DIAGNOSIS — I87.1 COMPRESSION OF VEIN: ICD-10-CM

## 2025-05-28 DIAGNOSIS — Z98.890 OTHER SPECIFIED POSTPROCEDURAL STATES: Chronic | ICD-10-CM

## 2025-05-28 LAB
BLD GP AB SCN SERPL QL: NEGATIVE — SIGNIFICANT CHANGE UP
HCT VFR BLD CALC: 42.4 % — SIGNIFICANT CHANGE UP (ref 34.5–45)
HGB BLD-MCNC: 14.4 G/DL — SIGNIFICANT CHANGE UP (ref 11.5–15.5)
MCHC RBC-ENTMCNC: 31.5 PG — SIGNIFICANT CHANGE UP (ref 27–34)
MCHC RBC-ENTMCNC: 34 G/DL — SIGNIFICANT CHANGE UP (ref 32–36)
MCV RBC AUTO: 92.8 FL — SIGNIFICANT CHANGE UP (ref 80–100)
NRBC BLD AUTO-RTO: 0 /100 WBCS — SIGNIFICANT CHANGE UP (ref 0–0)
PLATELET # BLD AUTO: 213 K/UL — SIGNIFICANT CHANGE UP (ref 150–400)
RBC # BLD: 4.57 M/UL — SIGNIFICANT CHANGE UP (ref 3.8–5.2)
RBC # FLD: 14.1 % — SIGNIFICANT CHANGE UP (ref 10.3–14.5)
RH IG SCN BLD-IMP: POSITIVE — SIGNIFICANT CHANGE UP
WBC # BLD: 7.56 K/UL — SIGNIFICANT CHANGE UP (ref 3.8–10.5)
WBC # FLD AUTO: 7.56 K/UL — SIGNIFICANT CHANGE UP (ref 3.8–10.5)

## 2025-05-28 PROCEDURE — 86850 RBC ANTIBODY SCREEN: CPT

## 2025-05-28 PROCEDURE — 86901 BLOOD TYPING SEROLOGIC RH(D): CPT

## 2025-05-28 PROCEDURE — 86900 BLOOD TYPING SEROLOGIC ABO: CPT

## 2025-05-28 PROCEDURE — G0463: CPT

## 2025-05-28 PROCEDURE — 85027 COMPLETE CBC AUTOMATED: CPT

## 2025-05-28 NOTE — H&P PST ADULT - ASSESSMENT
DASI score: 8  DASI activity: exercise 3x/week-pilates, weights, able to walk up 5 flights of stairs   Loose teeth or denture: denies     CAPRINI SCORE    AGE RELATED RISK FACTORS                                                             [x ] Age 41-60 years                                            (1 Point)  [ ] Age: 61-74 years                                           (2 Points)                 [ ] Age= 75 years                                                (3 Points)             DISEASE RELATED RISK FACTORS                                                       [ ] Edema in the lower extremities                 (1 Point)                     [ ] Varicose veins                                               (1 Point)                                 [ ] BMI > 25 Kg/m2                                            (1 Point)                                  [ ] Serious infection (ie PNA)                            (1 Point)                     [ ] Lung disease ( COPD, Emphysema)            (1 Point)                                                                          [ ] Acute myocardial infarction                         (1 Point)                  [ ] Congestive heart failure (in the previous month)  (1 Point)         [ ] Inflammatory bowel disease                            (1 Point)                  [ ] Central venous access, PICC or Port               (2 points)       (within the last month)                                                                [ ] Stroke (in the previous month)                        (5 Points)    [ ] Previous or present malignancy                       (2 points)                                                                                                                                                         HEMATOLOGY RELATED FACTORS                                                         [ ] Prior episodes of VTE                                     (3 Points)                     [ ] Positive family history for VTE                      (3 Points)                  [ ] Prothrombin 43630 A                                     (3 Points)                     [ ] Factor V Leiden                                                (3 Points)                        [ ] Lupus anticoagulants                                      (3 Points)                                                           [ ] Anticardiolipin antibodies                              (3 Points)                                                       [ ] High homocysteine in the blood                   (3 Points)                                             [ ] Other congenital or acquired thrombophilia      (3 Points)                                                [ ] Heparin induced thrombocytopenia                  (3 Points)                                        MOBILITY RELATED FACTORS  [ ] Bed rest                                                         (1 Point)  [ ] Plaster cast                                                    (2 points)  [ ] Bed bound for more than 72 hours           (2 Points)    GENDER SPECIFIC FACTORS  [ ] Pregnancy or had a baby within the last month   (1 Point)  [ ] Post-partum < 6 weeks                                   (1 Point)  [ ] Hormonal therapy  or oral contraception   (1 Point)  [ ] History of pregnancy complications              (1 point)  [ ] Unexplained or recurrent              (1 Point)    OTHER RISK FACTORS                                           (1 Point)  [ ] BMI >40, smoking, diabetes requiring insulin, chemotherapy  blood transfusions and length of surgery over 2 hours    SURGERY RELATED RISK FACTORS  [ ]  Section within the last month     (1 Point)  [ ] Minor surgery                                                  (1 Point)  [ ] Arthroscopic surgery                                       (2 Points)  [ x] Planned major surgery lasting more            (2 Points)      than 45 minutes     [ ] Elective hip or knee joint replacement       (5 points)       surgery                                                TRAUMA RELATED RISK FACTORS  [ ] Fracture of the hip, pelvis, or leg                       (5 Points)  [ ] Spinal cord injury resulting in paralysis             (5 points)       (in the previous month)    [ ] Paralysis  (less than 1 month)                             (5 Points)  [ ] Multiple Trauma within 1 month                        (5 Points)    Total Score [  3      ]    Caprini Score 0-2: Low Risk, NO VTE prophylaxis required for most patients, encourage ambulation  Caprini Score 3-6: Moderate Risk , pharmacologic VTE prophylaxis is indicated for most patients (in the absence of contraindications)  Caprini Score Greater than or =7: High risk, pharmocologic VTE prophylaxis indicated for most patients (in the absence of contraindications)

## 2025-05-28 NOTE — H&P PST ADULT - PROBLEM SELECTOR PLAN 1
Pt scheduled for Robotic left nephrectomy with Dr. Dyllan Humphrey on 6/16/2025.  -Pre op instructions provided.  -Unable to provide Chlorhexidine wash to pt as she is sensitive to it (reaction - Hives).  She was advised in the past to use Dial soap.  LABS: CBC, T&S done at [facility].  (All other labs 5/15/2025 - in [employer] and HIE).  DOS; ABO, urine pregnancy STAT.

## 2025-05-28 NOTE — H&P PST ADULT - GASTROINTESTINAL COMMENTS
malrotation sp [place] Procedure (2016 and 2019) malrotation s/p [place] Procedure (2016 and 2019), chronic stomach issues.

## 2025-05-28 NOTE — H&P PST ADULT - NSICDXPASTSURGICALHX_GEN_ALL_CORE_FT
PAST SURGICAL HISTORY:  Deviated septum s/p repair 1997    H/O breast surgery x 2 revision of augmentation 2006    H/O hernia repair     H/O of rectopexy     History of breast implant removal     S/P appendectomy     S/P breast augmentation with Saline 2006    S/P cholecystectomy     S/P [place] procedure     S/P total colectomy

## 2025-05-28 NOTE — H&P PST ADULT - HISTORY OF PRESENT ILLNESS
42y/o F, PMHx of ADD, h/o adrenal disease (that resolved), ITP (diagnosed years ago, platelet count has normalized since then),  with malrotation sp [place] Procedure (2016 and 2019) and multiple procedures from chronic constipation and slow transit colonic inertia.  (3/5/24) had laparoscopic suture rectopexy. Also hx of Migraines.  Pt had autotransplant surgery for nutcracker syndrome (10/31/2024) with Dr. Humphrey but was did not go through with procedure..  PT continues to have left sided lower abdominal pain, worsened with Valsalva. Has significant abd pain with urination.  PT currently  denies any fever, chills, SOB, CP, palpitations, dizziness, or HA. Mild nausea tolerable but no vomiting.  She is on Miralax daily to help with BM.  Pt scheduled for Robotic left nephrectomy with Dr. Dyllan Humphrey on 6/16/2025.    ***Of note, Pt went to the ED 5/14/2025  with c/o abd and lower back pain worsening for approximately 1 week, nausea, mucous in stool, lightheadedness. Pt was discharged home afterwards with no hospitalization. 44y/o F, PMHx of ADD, h/o adrenal disease (that resolved), ITP (diagnosed years ago, platelet count has normalized since then),  with malrotation sp [place] Procedure (2016 and 2019) and multiple procedures from chronic constipation and slow transit colonic inertia.  (3/5/24) had laparoscopic suture rectopexy. Also hx of Migraines.  Pt had autotransplant surgery for nutcracker syndrome (10/31/2024) with Dr. Humphrey but was did not go through with procedure..  PT continues to have left sided lower abdominal pain, worsened with Valsalva. Has significant abd pain with urination.  PT currently  denies any fever, chills, SOB, CP, palpitations, dizziness, or HA. Mild nausea tolerable but no vomiting.  She is on Miralax daily to help with BM.  Pt scheduled for Robotic left nephrectomy with Dr. Dyllan Humphrey on 6/16/2025.    ***Of note, Pt went to the ED 5/14/2025 at [facility] with c/o abd and lower back pain worsening for approximately 1 week, nausea, mucous in stool, lightheadedness. Pt was discharged home afterwards with no hospitalization. 44y/o F, PMHx of ADD, migraines, h/o adrenal disease (that resolved), ITP (diagnosed years ago, platelet count has normalized since then),  with malrotation s/p [place] Procedure (2016 and 2019) and multiple procedures from chronic constipation and slow transit colonic inertia. Pt  had laparoscopic suture rectopexy (3/5/24) for slow transit constipation and prolapse.  Pt was supposed to have autotransplant surgery for nutcracker syndrome (10/31/2024) with Dr. Humphrey but did not go through with procedure.  PT continues to have left sided lower abdominal pain, worsened with Valsalva. Has significant abdominal pain.  She takes Tylenol and Robaxin on as needed basis.    PT currently  denies any fever, chills, SOB, CP, palpitations, dizziness, or HA. Mild nausea tolerable but no vomiting.  She is on Miralax daily to help with BM.  Pt scheduled for Robotic left nephrectomy with Dr. Dyllan Humphrey on 6/16/2025.    ***Of note, Pt went to the ED 5/14/2025 at [facility] with c/o abd and lower back pain worsening for approximately 1 week, nausea, mucous in stool, lightheadedness. Pt was discharged home afterwards with no hospitalization.

## 2025-05-28 NOTE — H&P PST ADULT - RESPIRATORY AND THORAX
Reason for call:   [x] Refill   [] Prior Auth  [] Other:     Office:   [x] PCP/Provider - Dr Bird  [] Specialty/Provider -     Medication:   Tramadol 50 mg, 1 q6 prn, 120  Torsemide 10 mg, 1 qd, 90      Pharmacy:   [facility] [address] [place]    Does the patient have enough for 3 days?   [] Yes   [x] No - Send as HP to POD     details…

## 2025-05-28 NOTE — H&P PST ADULT - PAIN SCORE
Preventive Health Recommendations  Male Ages 40 to 49    Yearly exam:             See your health care provider every year in order to  o   Review health changes.   o   Discuss preventive care.    o   Review your medicines if your doctor has prescribed any.    You should be tested each year for STDs (sexually transmitted diseases) if you re at risk.     Have a cholesterol test every 5 years.     Have a colonoscopy (test for colon cancer) if someone in your family has had colon cancer or polyps before age 50.     After age 45, have a diabetes test (fasting glucose). If you are at risk for diabetes, you should have this test every 3 years.      Talk with your health care provider about whether or not a prostate cancer screening test (PSA) is right for you.    Shots: Get a flu shot each year. Get a tetanus shot every 10 years.     Nutrition:    Eat at least 5 servings of fruits and vegetables daily.     Eat whole-grain bread, whole-wheat pasta and brown rice instead of white grains and rice.     Get adequate Calcium and Vitamin D.     Lifestyle    Exercise for at least 150 minutes a week (30 minutes a day, 5 days a week). This will help you control your weight and prevent disease.     Limit alcohol to one drink per day.     No smoking.     Wear sunscreen to prevent skin cancer.     See your dentist every six months for an exam and cleaning.      
0

## 2025-05-28 NOTE — H&P PST ADULT - NSICDXPASTMEDICALHX_GEN_ALL_CORE_FT
PAST MEDICAL HISTORY:  Acid reflux     ADD (attention deficit disorder)     Adrenal disease     Anemia     Dehydration     Hiatal hernia     History of ITP     Hyperlipidemia     Intestinal malrotation     Nutcracker phenomenon of renal vein     Palpitations     Pre-diabetes     Seizure disorder States it's "hormonal"  related to dehydration. Last episode 2014    Thyroid disease As per patient both "hyper and hypo"    TIA (transient ischemic attack) Multiple -- "hormonal" Last episode 2014

## 2025-05-28 NOTE — H&P PST ADULT - OTHER CARE PROVIDERS
cardiologist Dr. Matute [phone] (1 year ago); hematology Dr. Shi [phone] (weekly on Sunday);

## 2025-05-28 NOTE — H&P PST ADULT - ATTENDING COMMENTS
42yo F w NLRV syndrome, had venography.  Multiple colonic surgeries complicating.  Initially planning on L autotxp, but patient wishes to have nephrectomy.  Has b/l similar renal function by nuc medicine.  Evaluated by nephrology.  Will plan for robotic assisted, possible open L nephrectomy.  Discussed risks of bleeding, infection, damage to adjacent structures.  Discussed possibility of proceeding with open surgery.   All questions answered.  Consent signed.

## 2025-06-09 ENCOUNTER — NON-APPOINTMENT (OUTPATIENT)
Age: 45
End: 2025-06-09

## 2025-06-11 LAB
C TRACH RRNA SPEC QL NAA+PROBE: NOT DETECTED
N GONORRHOEA RRNA SPEC QL NAA+PROBE: NOT DETECTED
SOURCE AMPLIFICATION: NORMAL

## 2025-06-16 ENCOUNTER — RESULT REVIEW (OUTPATIENT)
Age: 45
End: 2025-06-16

## 2025-06-16 ENCOUNTER — INPATIENT (INPATIENT)
Facility: HOSPITAL | Age: 45
LOS: 0 days | Discharge: HOME | DRG: 660 | End: 2025-06-17
Attending: TRANSPLANT SURGERY | Admitting: TRANSPLANT SURGERY
Payer: MEDICAID

## 2025-06-16 ENCOUNTER — APPOINTMENT (OUTPATIENT)
Dept: TRANSPLANT | Facility: HOSPITAL | Age: 45
End: 2025-06-16

## 2025-06-16 VITALS
RESPIRATION RATE: 17 BRPM | HEIGHT: 62.99 IN | HEART RATE: 88 BPM | WEIGHT: 108.03 LBS | TEMPERATURE: 98 F | DIASTOLIC BLOOD PRESSURE: 89 MMHG | SYSTOLIC BLOOD PRESSURE: 120 MMHG

## 2025-06-16 DIAGNOSIS — Z98.890 OTHER SPECIFIED POSTPROCEDURAL STATES: Chronic | ICD-10-CM

## 2025-06-16 DIAGNOSIS — J34.2 DEVIATED NASAL SEPTUM: Chronic | ICD-10-CM

## 2025-06-16 DIAGNOSIS — Z98.86 PERSONAL HISTORY OF BREAST IMPLANT REMOVAL: Chronic | ICD-10-CM

## 2025-06-16 DIAGNOSIS — Z98.82 BREAST IMPLANT STATUS: Chronic | ICD-10-CM

## 2025-06-16 DIAGNOSIS — Z98.89 OTHER SPECIFIED POSTPROCEDURAL STATES: Chronic | ICD-10-CM

## 2025-06-16 DIAGNOSIS — I87.1 COMPRESSION OF VEIN: ICD-10-CM

## 2025-06-16 LAB
ALBUMIN SERPL ELPH-MCNC: 3.6 G/DL — SIGNIFICANT CHANGE UP (ref 3.3–5)
ALBUMIN SERPL ELPH-MCNC: 3.6 G/DL — SIGNIFICANT CHANGE UP (ref 3.3–5)
ALP SERPL-CCNC: 58 U/L — SIGNIFICANT CHANGE UP (ref 40–120)
ALP SERPL-CCNC: 61 U/L — SIGNIFICANT CHANGE UP (ref 40–120)
ALT FLD-CCNC: 10 U/L — SIGNIFICANT CHANGE UP (ref 10–45)
ALT FLD-CCNC: 8 U/L — LOW (ref 10–45)
ANION GAP SERPL CALC-SCNC: 13 MMOL/L — SIGNIFICANT CHANGE UP (ref 5–17)
ANION GAP SERPL CALC-SCNC: 14 MMOL/L — SIGNIFICANT CHANGE UP (ref 5–17)
AST SERPL-CCNC: 18 U/L — SIGNIFICANT CHANGE UP (ref 10–40)
AST SERPL-CCNC: 19 U/L — SIGNIFICANT CHANGE UP (ref 10–40)
BILIRUB SERPL-MCNC: 0.2 MG/DL — SIGNIFICANT CHANGE UP (ref 0.2–1.2)
BILIRUB SERPL-MCNC: 0.2 MG/DL — SIGNIFICANT CHANGE UP (ref 0.2–1.2)
BUN SERPL-MCNC: 4 MG/DL — LOW (ref 7–23)
BUN SERPL-MCNC: <4 MG/DL — LOW (ref 7–23)
CALCIUM SERPL-MCNC: 7.8 MG/DL — LOW (ref 8.4–10.5)
CALCIUM SERPL-MCNC: 8 MG/DL — LOW (ref 8.4–10.5)
CHLORIDE SERPL-SCNC: 101 MMOL/L — SIGNIFICANT CHANGE UP (ref 96–108)
CHLORIDE SERPL-SCNC: 103 MMOL/L — SIGNIFICANT CHANGE UP (ref 96–108)
CO2 SERPL-SCNC: 20 MMOL/L — LOW (ref 22–31)
CO2 SERPL-SCNC: 20 MMOL/L — LOW (ref 22–31)
CREAT SERPL-MCNC: 0.67 MG/DL — SIGNIFICANT CHANGE UP (ref 0.5–1.3)
CREAT SERPL-MCNC: 0.79 MG/DL — SIGNIFICANT CHANGE UP (ref 0.5–1.3)
EGFR: 110 ML/MIN/1.73M2 — SIGNIFICANT CHANGE UP
EGFR: 110 ML/MIN/1.73M2 — SIGNIFICANT CHANGE UP
EGFR: 94 ML/MIN/1.73M2 — SIGNIFICANT CHANGE UP
EGFR: 94 ML/MIN/1.73M2 — SIGNIFICANT CHANGE UP
GLUCOSE BLDC GLUCOMTR-MCNC: 135 MG/DL — HIGH (ref 70–99)
GLUCOSE BLDC GLUCOMTR-MCNC: 153 MG/DL — HIGH (ref 70–99)
GLUCOSE SERPL-MCNC: 136 MG/DL — HIGH (ref 70–99)
GLUCOSE SERPL-MCNC: 170 MG/DL — HIGH (ref 70–99)
HCT VFR BLD CALC: 36.4 % — SIGNIFICANT CHANGE UP (ref 34.5–45)
HCT VFR BLD CALC: 38.7 % — SIGNIFICANT CHANGE UP (ref 34.5–45)
HGB BLD-MCNC: 12.2 G/DL — SIGNIFICANT CHANGE UP (ref 11.5–15.5)
HGB BLD-MCNC: 13 G/DL — SIGNIFICANT CHANGE UP (ref 11.5–15.5)
MAGNESIUM SERPL-MCNC: 1.9 MG/DL — SIGNIFICANT CHANGE UP (ref 1.6–2.6)
MAGNESIUM SERPL-MCNC: 1.9 MG/DL — SIGNIFICANT CHANGE UP (ref 1.6–2.6)
MCHC RBC-ENTMCNC: 30.8 PG — SIGNIFICANT CHANGE UP (ref 27–34)
MCHC RBC-ENTMCNC: 31.1 PG — SIGNIFICANT CHANGE UP (ref 27–34)
MCHC RBC-ENTMCNC: 33.5 G/DL — SIGNIFICANT CHANGE UP (ref 32–36)
MCHC RBC-ENTMCNC: 33.6 G/DL — SIGNIFICANT CHANGE UP (ref 32–36)
MCV RBC AUTO: 91.9 FL — SIGNIFICANT CHANGE UP (ref 80–100)
MCV RBC AUTO: 92.6 FL — SIGNIFICANT CHANGE UP (ref 80–100)
NRBC BLD AUTO-RTO: 0 /100 WBCS — SIGNIFICANT CHANGE UP (ref 0–0)
NRBC BLD AUTO-RTO: 0 /100 WBCS — SIGNIFICANT CHANGE UP (ref 0–0)
PHOSPHATE SERPL-MCNC: 3 MG/DL — SIGNIFICANT CHANGE UP (ref 2.5–4.5)
PHOSPHATE SERPL-MCNC: 3.7 MG/DL — SIGNIFICANT CHANGE UP (ref 2.5–4.5)
PLATELET # BLD AUTO: 165 K/UL — SIGNIFICANT CHANGE UP (ref 150–400)
PLATELET # BLD AUTO: 168 K/UL — SIGNIFICANT CHANGE UP (ref 150–400)
POTASSIUM SERPL-MCNC: 3.6 MMOL/L — SIGNIFICANT CHANGE UP (ref 3.5–5.3)
POTASSIUM SERPL-MCNC: 3.9 MMOL/L — SIGNIFICANT CHANGE UP (ref 3.5–5.3)
POTASSIUM SERPL-SCNC: 3.6 MMOL/L — SIGNIFICANT CHANGE UP (ref 3.5–5.3)
POTASSIUM SERPL-SCNC: 3.9 MMOL/L — SIGNIFICANT CHANGE UP (ref 3.5–5.3)
PROT SERPL-MCNC: 5.5 G/DL — LOW (ref 6–8.3)
PROT SERPL-MCNC: 5.9 G/DL — LOW (ref 6–8.3)
RBC # BLD: 3.96 M/UL — SIGNIFICANT CHANGE UP (ref 3.8–5.2)
RBC # BLD: 4.18 M/UL — SIGNIFICANT CHANGE UP (ref 3.8–5.2)
RBC # FLD: 14.2 % — SIGNIFICANT CHANGE UP (ref 10.3–14.5)
RBC # FLD: 14.2 % — SIGNIFICANT CHANGE UP (ref 10.3–14.5)
SODIUM SERPL-SCNC: 134 MMOL/L — LOW (ref 135–145)
SODIUM SERPL-SCNC: 137 MMOL/L — SIGNIFICANT CHANGE UP (ref 135–145)
WBC # BLD: 13.24 K/UL — HIGH (ref 3.8–10.5)
WBC # BLD: 8.81 K/UL — SIGNIFICANT CHANGE UP (ref 3.8–10.5)
WBC # FLD AUTO: 13.24 K/UL — HIGH (ref 3.8–10.5)
WBC # FLD AUTO: 8.81 K/UL — SIGNIFICANT CHANGE UP (ref 3.8–10.5)

## 2025-06-16 PROCEDURE — 88312 SPECIAL STAINS GROUP 1: CPT | Mod: 26

## 2025-06-16 PROCEDURE — 88307 TISSUE EXAM BY PATHOLOGIST: CPT | Mod: 26

## 2025-06-16 PROCEDURE — 50546 LAPAROSCOPIC NEPHRECTOMY: CPT | Mod: LT,GC

## 2025-06-16 PROCEDURE — 93010 ELECTROCARDIOGRAM REPORT: CPT

## 2025-06-16 PROCEDURE — 71045 X-RAY EXAM CHEST 1 VIEW: CPT | Mod: 26

## 2025-06-16 DEVICE — KIT A-LINE 1LUM 20G X 12CM SAFE KIT: Type: IMPLANTABLE DEVICE | Status: FUNCTIONAL

## 2025-06-16 DEVICE — LIGATING CLIPS WECK HEMOLOK POLYMER MEDIUM-LARGE (GREEN) 6: Type: IMPLANTABLE DEVICE | Status: FUNCTIONAL

## 2025-06-16 DEVICE — SURGICEL POWDER 3 GRAMS: Type: IMPLANTABLE DEVICE | Status: FUNCTIONAL

## 2025-06-16 DEVICE — STAPLER ETHICON ECHELON FLEX 45MM X 340MM: Type: IMPLANTABLE DEVICE | Status: FUNCTIONAL

## 2025-06-16 DEVICE — STAPLER ETHICON GST ECHELON 45MM WHITE RELOAD: Type: IMPLANTABLE DEVICE | Status: FUNCTIONAL

## 2025-06-16 DEVICE — SURGICEL 2 X 14": Type: IMPLANTABLE DEVICE | Status: FUNCTIONAL

## 2025-06-16 RX ORDER — OXYCODONE HYDROCHLORIDE 30 MG/1
2.5 TABLET ORAL EVERY 8 HOURS
Refills: 0 | Status: DISCONTINUED | OUTPATIENT
Start: 2025-06-16 | End: 2025-06-16

## 2025-06-16 RX ORDER — HYDROMORPHONE/SOD CHLOR,ISO/PF 2 MG/10 ML
0.5 SYRINGE (ML) INJECTION EVERY 8 HOURS
Refills: 0 | Status: DISCONTINUED | OUTPATIENT
Start: 2025-06-16 | End: 2025-06-17

## 2025-06-16 RX ORDER — LIDOCAINE HCL/PF 10 MG/ML
0.2 VIAL (ML) INJECTION ONCE
Refills: 0 | Status: DISCONTINUED | OUTPATIENT
Start: 2025-06-16 | End: 2025-06-16

## 2025-06-16 RX ORDER — HYDROMORPHONE/SOD CHLOR,ISO/PF 2 MG/10 ML
0.2 SYRINGE (ML) INJECTION EVERY 8 HOURS
Refills: 0 | Status: DISCONTINUED | OUTPATIENT
Start: 2025-06-16 | End: 2025-06-17

## 2025-06-16 RX ORDER — OXYCODONE HYDROCHLORIDE 30 MG/1
10 TABLET ORAL EVERY 8 HOURS
Refills: 0 | Status: DISCONTINUED | OUTPATIENT
Start: 2025-06-16 | End: 2025-06-16

## 2025-06-16 RX ORDER — HYDROMORPHONE/SOD CHLOR,ISO/PF 2 MG/10 ML
0.25 SYRINGE (ML) INJECTION
Refills: 0 | Status: DISCONTINUED | OUTPATIENT
Start: 2025-06-16 | End: 2025-06-16

## 2025-06-16 RX ORDER — METHOCARBAMOL 500 MG/1
250 TABLET, FILM COATED ORAL DAILY
Refills: 0 | Status: DISCONTINUED | OUTPATIENT
Start: 2025-06-16 | End: 2025-06-16

## 2025-06-16 RX ORDER — ACETAMINOPHEN 500 MG/5ML
725 LIQUID (ML) ORAL ONCE
Refills: 0 | Status: COMPLETED | OUTPATIENT
Start: 2025-06-16 | End: 2025-06-16

## 2025-06-16 RX ORDER — OXYCODONE HYDROCHLORIDE 30 MG/1
5 TABLET ORAL EVERY 8 HOURS
Refills: 0 | Status: DISCONTINUED | OUTPATIENT
Start: 2025-06-16 | End: 2025-06-16

## 2025-06-16 RX ORDER — TRAMADOL HYDROCHLORIDE 50 MG/1
25 TABLET, FILM COATED ORAL EVERY 6 HOURS
Refills: 0 | Status: DISCONTINUED | OUTPATIENT
Start: 2025-06-16 | End: 2025-06-16

## 2025-06-16 RX ORDER — METHOCARBAMOL 500 MG/1
250 TABLET, FILM COATED ORAL DAILY
Refills: 0 | Status: DISCONTINUED | OUTPATIENT
Start: 2025-06-16 | End: 2025-06-17

## 2025-06-16 RX ORDER — TRAMADOL HYDROCHLORIDE 50 MG/1
50 TABLET, FILM COATED ORAL EVERY 6 HOURS
Refills: 0 | Status: DISCONTINUED | OUTPATIENT
Start: 2025-06-16 | End: 2025-06-16

## 2025-06-16 RX ORDER — SODIUM CHLORIDE 9 G/1000ML
1000 INJECTION, SOLUTION INTRAVENOUS
Refills: 0 | Status: DISCONTINUED | OUTPATIENT
Start: 2025-06-16 | End: 2025-06-17

## 2025-06-16 RX ADMIN — Medication 290 MILLIGRAM(S): at 23:10

## 2025-06-16 RX ADMIN — METHOCARBAMOL 205 MILLIGRAM(S): 500 TABLET, FILM COATED ORAL at 22:30

## 2025-06-16 RX ADMIN — Medication 0.25 MILLIGRAM(S): at 13:45

## 2025-06-16 RX ADMIN — Medication 725 MILLIGRAM(S): at 23:40

## 2025-06-16 RX ADMIN — OXYCODONE HYDROCHLORIDE 2.5 MILLIGRAM(S): 30 TABLET ORAL at 16:00

## 2025-06-16 RX ADMIN — Medication 725 MILLIGRAM(S): at 16:15

## 2025-06-16 RX ADMIN — Medication 0.25 MILLIGRAM(S): at 14:00

## 2025-06-16 RX ADMIN — Medication 0.25 MILLIGRAM(S): at 14:15

## 2025-06-16 RX ADMIN — SODIUM CHLORIDE 125 MILLILITER(S): 9 INJECTION, SOLUTION INTRAVENOUS at 13:58

## 2025-06-16 RX ADMIN — Medication 0.25 MILLIGRAM(S): at 13:59

## 2025-06-16 RX ADMIN — OXYCODONE HYDROCHLORIDE 2.5 MILLIGRAM(S): 30 TABLET ORAL at 15:30

## 2025-06-16 RX ADMIN — Medication 290 MILLIGRAM(S): at 16:00

## 2025-06-16 NOTE — PROGRESS NOTE ADULT - ASSESSMENT
42y/o F, PMHx of ADD, migraines, h/o adrenal disease (that resolved), ITP (diagnosed years ago, platelet count has normalized since then),  with malrotation s/p [place] Procedure (2016 and 2019) and multiple procedures from chronic constipation and slow transit colonic inertia. Pt  had laparoscopic suture rectopexy (3/5/24) for slow transit constipation and prolapse.  Pt was supposed to have autotransplant surgery for nutcracker syndrome (10/31/2024) with Dr. Humphrey but did not go through with procedure.  PT continues to have left sided lower abdominal pain, worsened with Valsalva. Has significant abdominal pain.  She takes Tylenol and Robaxin on as needed basis.    PT currently  denies any fever, chills, SOB, CP, palpitations, dizziness, or HA. Mild nausea tolerable but no vomiting.  She is on Miralax daily to help with BM.  Pt presents to [facility] for Robotic left nephrectomy     [] s/p robotic nephrectomy  - Monitor renal function  - Strict I&O's   - IVF: LR @ 125cc/hr  - ADAT  - Pain control prn  - IS/ SCDs/OOB  - Daily labs

## 2025-06-16 NOTE — BRIEF OPERATIVE NOTE - NSICDXBRIEFPROCEDURE_GEN_ALL_CORE_FT
PROCEDURES:  Robot-assisted laparoscopic left nephrectomy with partial ureterectomy 17-Jun-2025 00:05:56  Moses Kim

## 2025-06-16 NOTE — PATIENT PROFILE ADULT - DO YOU FEEL THREATENED BY OTHERS?
-- DO NOT REPLY / DO NOT REPLY ALL --  -- Message is from the [facility]--    Referral Request  Name of Specialist: Diabetic counseling  Provider's specialty: Other: diabetic counsling     Medical condition for referral:  Patient needs referral     Is this a NEW request?: yes      Referral ordered by: dr lang      Insurance type:       Payor: BLUE [employer] / Plan: [employer] HMO [facility] / Product Type: HMO      Preferred Delivery Method   Fax - number to send to: [phone]    Caller Information       Type Contact Phone    01/29/2021 03:10 PM CST Phone (Incoming) linda rahmanerd  [phone]        Alternative phone number: n/a    Turnaround time given to caller:   \"This message will be sent to [state Provider's full name]. The clinical team will return your call as soon as they review your message. Typically, it takes 3 business days to process referral requests.\"   no

## 2025-06-17 ENCOUNTER — TRANSCRIPTION ENCOUNTER (OUTPATIENT)
Age: 45
End: 2025-06-17

## 2025-06-17 VITALS
HEART RATE: 80 BPM | DIASTOLIC BLOOD PRESSURE: 80 MMHG | RESPIRATION RATE: 14 BRPM | SYSTOLIC BLOOD PRESSURE: 117 MMHG | OXYGEN SATURATION: 98 % | TEMPERATURE: 98 F

## 2025-06-17 LAB
ALBUMIN SERPL ELPH-MCNC: 3.1 G/DL — LOW (ref 3.3–5)
ALP SERPL-CCNC: 53 U/L — SIGNIFICANT CHANGE UP (ref 40–120)
ALT FLD-CCNC: 8 U/L — LOW (ref 10–45)
ANION GAP SERPL CALC-SCNC: 10 MMOL/L — SIGNIFICANT CHANGE UP (ref 5–17)
AST SERPL-CCNC: 17 U/L — SIGNIFICANT CHANGE UP (ref 10–40)
BILIRUB SERPL-MCNC: 0.5 MG/DL — SIGNIFICANT CHANGE UP (ref 0.2–1.2)
BUN SERPL-MCNC: 6 MG/DL — LOW (ref 7–23)
CALCIUM SERPL-MCNC: 8.4 MG/DL — SIGNIFICANT CHANGE UP (ref 8.4–10.5)
CHLORIDE SERPL-SCNC: 104 MMOL/L — SIGNIFICANT CHANGE UP (ref 96–108)
CO2 SERPL-SCNC: 23 MMOL/L — SIGNIFICANT CHANGE UP (ref 22–31)
CREAT SERPL-MCNC: 1.08 MG/DL — SIGNIFICANT CHANGE UP (ref 0.5–1.3)
EGFR: 65 ML/MIN/1.73M2 — SIGNIFICANT CHANGE UP
EGFR: 65 ML/MIN/1.73M2 — SIGNIFICANT CHANGE UP
GLUCOSE SERPL-MCNC: 90 MG/DL — SIGNIFICANT CHANGE UP (ref 70–99)
HCT VFR BLD CALC: 35.3 % — SIGNIFICANT CHANGE UP (ref 34.5–45)
HGB BLD-MCNC: 11.8 G/DL — SIGNIFICANT CHANGE UP (ref 11.5–15.5)
MAGNESIUM SERPL-MCNC: 1.9 MG/DL — SIGNIFICANT CHANGE UP (ref 1.6–2.6)
MCHC RBC-ENTMCNC: 31.1 PG — SIGNIFICANT CHANGE UP (ref 27–34)
MCHC RBC-ENTMCNC: 33.4 G/DL — SIGNIFICANT CHANGE UP (ref 32–36)
MCV RBC AUTO: 93.1 FL — SIGNIFICANT CHANGE UP (ref 80–100)
NRBC BLD AUTO-RTO: 0 /100 WBCS — SIGNIFICANT CHANGE UP (ref 0–0)
PHOSPHATE SERPL-MCNC: 4 MG/DL — SIGNIFICANT CHANGE UP (ref 2.5–4.5)
PLATELET # BLD AUTO: 163 K/UL — SIGNIFICANT CHANGE UP (ref 150–400)
POTASSIUM SERPL-MCNC: 4.1 MMOL/L — SIGNIFICANT CHANGE UP (ref 3.5–5.3)
POTASSIUM SERPL-SCNC: 4.1 MMOL/L — SIGNIFICANT CHANGE UP (ref 3.5–5.3)
PROT SERPL-MCNC: 5.3 G/DL — LOW (ref 6–8.3)
RBC # BLD: 3.79 M/UL — LOW (ref 3.8–5.2)
RBC # FLD: 14.4 % — SIGNIFICANT CHANGE UP (ref 10.3–14.5)
SODIUM SERPL-SCNC: 137 MMOL/L — SIGNIFICANT CHANGE UP (ref 135–145)
WBC # BLD: 12.6 K/UL — HIGH (ref 3.8–10.5)
WBC # FLD AUTO: 12.6 K/UL — HIGH (ref 3.8–10.5)

## 2025-06-17 PROCEDURE — 36415 COLL VENOUS BLD VENIPUNCTURE: CPT

## 2025-06-17 PROCEDURE — 83735 ASSAY OF MAGNESIUM: CPT

## 2025-06-17 PROCEDURE — 80053 COMPREHEN METABOLIC PANEL: CPT

## 2025-06-17 PROCEDURE — 85027 COMPLETE CBC AUTOMATED: CPT

## 2025-06-17 PROCEDURE — 93005 ELECTROCARDIOGRAM TRACING: CPT

## 2025-06-17 PROCEDURE — C1889: CPT

## 2025-06-17 PROCEDURE — C9399: CPT

## 2025-06-17 PROCEDURE — 84100 ASSAY OF PHOSPHORUS: CPT

## 2025-06-17 PROCEDURE — 88307 TISSUE EXAM BY PATHOLOGIST: CPT

## 2025-06-17 PROCEDURE — 88312 SPECIAL STAINS GROUP 1: CPT

## 2025-06-17 PROCEDURE — S2900: CPT

## 2025-06-17 PROCEDURE — 82962 GLUCOSE BLOOD TEST: CPT

## 2025-06-17 PROCEDURE — C1769: CPT

## 2025-06-17 PROCEDURE — 71045 X-RAY EXAM CHEST 1 VIEW: CPT

## 2025-06-17 RX ORDER — ACETAMINOPHEN 500 MG/5ML
725 LIQUID (ML) ORAL ONCE
Refills: 0 | Status: COMPLETED | OUTPATIENT
Start: 2025-06-17 | End: 2025-06-17

## 2025-06-17 RX ADMIN — METHOCARBAMOL 205 MILLIGRAM(S): 500 TABLET, FILM COATED ORAL at 10:17

## 2025-06-17 RX ADMIN — Medication 290 MILLIGRAM(S): at 08:29

## 2025-06-17 NOTE — PROGRESS NOTE ADULT - SUBJECTIVE AND OBJECTIVE BOX
Transplant Surgery - Multidisciplinary Rounds   --------------------------------------------------------------  Nephrectomy         POD#1    HPI: 44y/o F, PMHx of ADD, migraines, h/o adrenal disease (that resolved), ITP (diagnosed years ago, platelet count has normalized since then),  with malrotation s/p [place] Procedure (2016 and 2019) and multiple procedures from chronic constipation and slow transit colonic inertia. Pt  had laparoscopic suture rectopexy (3/5/24) for slow transit constipation and prolapse.  Pt was supposed to have autotransplant surgery for nutcracker syndrome (10/31/2024) with Dr. Humphrey but did not go through with procedure.  PT continues to have left sided lower abdominal pain, worsened with Valsalva. Has significant abdominal pain.  She takes Tylenol and Robaxin on as needed basis.    PT currently  denies any fever, chills, SOB, CP, palpitations, dizziness, or HA. Mild nausea tolerable but no vomiting.  She is on Miralax daily to help with BM.  Pt presents to [facility] for Robotic left nephrectomy         Interval Events:        TX DATA HERE        Review of systems  Gen: No weight changes, fatigue, fevers/chills, weakness  Skin: No rashes  Head/Eyes/Ears/Mouth: No headache; Normal hearing; Normal vision w/o blurriness; No sinus pain/discomfort, sore throat  Respiratory: No dyspnea, cough, wheezing, hemoptysis  CV: No chest pain, PND, orthopnea  GI: Mild abdominal pain at surgical incision site; denies diarrhea, constipation, nausea, vomiting, melena, hematochezia  : No increased frequency, dysuria, hematuria, nocturia  MSK: No joint pain/swelling; no back pain; no edema  Neuro: No dizziness/lightheadedness, weakness, seizures, numbness, tingling  Heme: No easy bruising or bleeding  Endo: No heat/cold intolerance  Psych: No significant nervousness, anxiety, stress, depression  All other systems were reviewed and are negative, except as noted.      PHYSICAL EXAM:  Constitutional: Well developed / well nourished  Eyes: Anicteric, PERRLA  ENMT: nc/at  Neck: Supple, trachea midline;   Respiratory: CTA B/L  Cardiovascular: RRR  Gastrointestinal: Soft, non distended, mild tenderness at the incision site  Genitourinary: Urinary catheter in place  Extremities: SCD's in place and working bilaterally  Vascular: Palpable dp pulses bilaterally  Neurological: A&O x3  Skin: no rashes, ulcerations or lesions;  Musculoskeletal: Moving all extremities  Psychiatric: Responsive    
Transplant Surgery - Post op check  --------------------------------------------------------------  Nephrectomy         POD# 0    HPI: 42y/o F, PMHx of ADD, migraines, h/o adrenal disease (that resolved), ITP (diagnosed years ago, platelet count has normalized since then),  with malrotation s/p [place] Procedure (2016 and 2019) and multiple procedures from chronic constipation and slow transit colonic inertia. Pt  had laparoscopic suture rectopexy (3/5/24) for slow transit constipation and prolapse.  Pt was supposed to have autotransplant surgery for nutcracker syndrome (10/31/2024) with Dr. Humphrey but did not go through with procedure.  PT continues to have left sided lower abdominal pain, worsened with Valsalva. Has significant abdominal pain.  She takes Tylenol and Robaxin on as needed basis.    PT currently  denies any fever, chills, SOB, CP, palpitations, dizziness, or HA. Mild nausea tolerable but no vomiting.  She is on Miralax daily to help with BM.  Pt presents to [facility] for Robotic left nephrectomy         Interval Events:  - s/p nephrectomy   - EBL 10 cc, 1.1L crystalloids, 200cc UOP  - pt doing well, states mild pain      MEDICATIONS  (STANDING):  lactated ringers. 1000 milliLiter(s) (125 mL/Hr) IV Continuous <Continuous>    MEDICATIONS  (PRN):  oxyCODONE    IR 2.5 milliGRAM(s) Oral every 8 hours PRN Moderate Pain (4 - 6)  oxyCODONE    IR 5 milliGRAM(s) Oral every 8 hours PRN Severe Pain (7 - 10)      PAST MEDICAL & SURGICAL HISTORY:  TIA (transient ischemic attack)  Multiple -- "hormonal" Last episode 2014      Pre-diabetes      Thyroid disease  As per patient both "hyper and hypo"      Anemia      Adrenal disease      ADD (attention deficit disorder)      Seizure disorder  States it's "hormonal"  related to dehydration. Last episode 2014      Hyperlipidemia      Palpitations      Dehydration      Intestinal malrotation      Hiatal hernia      Acid reflux      Nutcracker phenomenon of renal vein      History of ITP      S/P breast augmentation  with Saline 2006      H/O breast surgery  x 2 revision of augmentation 2006      Deviated septum  s/p repair 1997      H/O of rectopexy      H/O hernia repair      S/P appendectomy      S/P cholecystectomy      S/P total colectomy      S/P [place] procedure      History of breast implant removal          Vital Signs Last 24 Hrs  T(C): 36.5 (16 Jun 2025 17:00), Max: 36.6 (16 Jun 2025 06:00)  T(F): 97.7 (16 Jun 2025 17:00), Max: 97.9 (16 Jun 2025 06:00)  HR: 88 (16 Jun 2025 17:00) (77 - 94)  BP: 129/73 (16 Jun 2025 17:00) (116/71 - 133/77)  BP(mean): 96 (16 Jun 2025 17:00) (89 - 99)  RR: 16 (16 Jun 2025 18:00) (14 - 17)  SpO2: 100% (16 Jun 2025 17:00) (99% - 100%)    Parameters below as of 16 Jun 2025 18:00  Patient On (Oxygen Delivery Method): room air    O2 Concentration (%): 21    I&O's Summary    16 Jun 2025 07:01  -  16 Jun 2025 18:20  --------------------------------------------------------  IN: 750 mL / OUT: 300 mL / NET: 450 mL      Daily Height in cm: 160 (16 Jun 2025 11:30)    Daily     Labs:                        13.0   13.24 )-----------( 168      ( 16 Jun 2025 16:50 )             38.7     06-16    134[L]  |  101  |  4[L]  ----------------------------<  136[H]  3.9   |  20[L]  |  0.79    Ca    8.0[L]      16 Jun 2025 16:50  Phos  3.7     06-16  Mg     1.9     06-16    TPro  5.9[L]  /  Alb  3.6  /  TBili  0.2  /  DBili  x   /  AST  18  /  ALT  10  /  AlkPhos  61  06-16      CAPILLARY BLOOD GLUCOSE      POCT Blood Glucose.: 135 mg/dL (16 Jun 2025 16:47)  POCT Blood Glucose.: 153 mg/dL (16 Jun 2025 13:18)        Urinalysis Basic - ( 16 Jun 2025 16:50 )    Color: x / Appearance: x / SG: x / pH: x  Gluc: 136 mg/dL / Ketone: x  / Bili: x / Urobili: x   Blood: x / Protein: x / Nitrite: x   Leuk Esterase: x / RBC: x / WBC x   Sq Epi: x / Non Sq Epi: x / Bacteria: x            Review of systems  Gen: No weight changes, fatigue, fevers/chills, weakness  Skin: No rashes  Head/Eyes/Ears/Mouth: No headache; Normal hearing; Normal vision w/o blurriness; No sinus pain/discomfort, sore throat  Respiratory: No dyspnea, cough, wheezing, hemoptysis  CV: No chest pain, PND, orthopnea  GI: Mild abdominal pain at surgical incision site; denies diarrhea, constipation, nausea, vomiting, melena, hematochezia  : No increased frequency, dysuria, hematuria, nocturia  MSK: No joint pain/swelling; no back pain; no edema  Neuro: No dizziness/lightheadedness, weakness, seizures, numbness, tingling  Heme: No easy bruising or bleeding  Endo: No heat/cold intolerance  Psych: No significant nervousness, anxiety, stress, depression  All other systems were reviewed and are negative, except as noted.      PHYSICAL EXAM:  Constitutional: Well developed / well nourished  Eyes: Anicteric, PERRLA  ENMT: nc/at  Neck: Supple, trachea midline;   Respiratory: CTA B/L  Cardiovascular: RRR  Gastrointestinal: Soft, non distended, mild tenderness at the incision site  Genitourinary: Urinary catheter in place  Extremities: SCD's in place and working bilaterally  Vascular: Palpable dp pulses bilaterally  Neurological: A&O x3  Skin: no rashes, ulcerations or lesions;  Musculoskeletal: Moving all extremities  Psychiatric: Responsive

## 2025-06-17 NOTE — ASU DISCHARGE PLAN (ADULT/PEDIATRIC) - CARE PROVIDER_API CALL
Dyllan Humphrey Deangelo  Surgery  [address] [place] [id number]  Phone: [phone]  Fax: [phone]  Follow Up Time:

## 2025-06-17 NOTE — PROGRESS NOTE ADULT - ASSESSMENT
44y/o F, PMHx of ADD, migraines, h/o adrenal disease (that resolved), ITP (diagnosed years ago, platelet count has normalized since then),  with malrotation s/p [place] Procedure (2016 and 2019) and multiple procedures from chronic constipation and slow transit colonic inertia. Pt  had laparoscopic suture rectopexy (3/5/24) for slow transit constipation and prolapse.  Pt was supposed to have autotransplant surgery for nutcracker syndrome (10/31/2024) with Dr. Humphrey but did not go through with procedure.  PT continues to have left sided lower abdominal pain, worsened with Valsalva. Has significant abdominal pain.  She takes Tylenol and Robaxin on as needed basis.    PT currently  denies any fever, chills, SOB, CP, palpitations, dizziness, or HA. Mild nausea tolerable but no vomiting.  She is on Miralax daily to help with BM.  Pt presents to [facility] for Robotic left nephrectomy     [] s/p robotic nephrectomy POD1  - Monitor renal function  - Strict I&O's   - IVF: LR @ 125cc/hr  - ADAT  - Pain control prn  - IS/ SCDs/OOB  - Daily labs

## 2025-06-17 NOTE — ASU DISCHARGE PLAN (ADULT/PEDIATRIC) - FINANCIAL ASSISTANCE
[facility] provides services at a reduced cost to those who are determined to be eligible through [facility]’s financial assistance program. Information regarding [facility]’s financial assistance program can be found by going to [email or web address] or by calling [phone].

## 2025-06-23 LAB — SURGICAL PATHOLOGY STUDY: SIGNIFICANT CHANGE UP

## 2025-06-23 NOTE — PATIENT PROFILE ADULT - NSTOBACCOCESSATIONEDU6_GEN_A_NUR
PRE-OP DIAGNOSIS:  Kyphosis 23-Jun-2025 11:45:52  Jaimee Garcia  Lumbar compression fracture 23-Jun-2025 11:46:00  Jaimee Garcia  Lumbar stenosis 23-Jun-2025 11:46:06  Jaimee Garcia   Use the 5 A's (Ask, Advise, Assess, Assist, Arrange)

## 2025-07-03 ENCOUNTER — APPOINTMENT (OUTPATIENT)
Age: 45
End: 2025-07-03
Payer: MEDICAID

## 2025-07-03 PROCEDURE — 99214 OFFICE O/P EST MOD 30 MIN: CPT | Mod: 95

## 2025-07-10 ENCOUNTER — APPOINTMENT (OUTPATIENT)
Dept: TRANSPLANT | Facility: CLINIC | Age: 45
End: 2025-07-10

## 2025-08-04 ENCOUNTER — RESULT REVIEW (OUTPATIENT)
Age: 45
End: 2025-08-04

## 2025-08-08 ENCOUNTER — RESULT REVIEW (OUTPATIENT)
Age: 45
End: 2025-08-08

## 2025-08-11 ENCOUNTER — APPOINTMENT (OUTPATIENT)
Dept: OBGYN | Facility: CLINIC | Age: 45
End: 2025-08-11

## 2025-09-09 ENCOUNTER — APPOINTMENT (OUTPATIENT)
Dept: SURGERY | Facility: CLINIC | Age: 45
End: 2025-09-09
Payer: MEDICAID

## 2025-09-09 VITALS
HEIGHT: 63 IN | SYSTOLIC BLOOD PRESSURE: 105 MMHG | WEIGHT: 98.38 LBS | BODY MASS INDEX: 17.43 KG/M2 | HEART RATE: 80 BPM | DIASTOLIC BLOOD PRESSURE: 74 MMHG

## 2025-09-09 DIAGNOSIS — I77.4 CELIAC ARTERY COMPRESSION SYNDROME: ICD-10-CM

## 2025-09-09 PROCEDURE — 99214 OFFICE O/P EST MOD 30 MIN: CPT | Mod: 24

## (undated) DEVICE — VENODYNE/SCD SLEEVE CALF MEDIUM

## (undated) DEVICE — MEDICATION LABELS W MARKER

## (undated) DEVICE — XI SCISSOR TIP COVER

## (undated) DEVICE — PACK ADVANCED LAPAROSCOPIC NS

## (undated) DEVICE — GLV 8 PROTEXIS (WHITE)

## (undated) DEVICE — XI SEAL UNIVERSIAL 5-12MM

## (undated) DEVICE — XI DRAPE COLUMN

## (undated) DEVICE — POSITIONER PATIENT SAFETY STRAP 3X60"

## (undated) DEVICE — DRAPE 1/2 SHEET 40X57"

## (undated) DEVICE — TAPE SILK 3"

## (undated) DEVICE — ELECTRO LUBE ANTI-STICK SOLUTION FOR CAUTERY TIP

## (undated) DEVICE — GRASPER LAPA 5MMX35CM

## (undated) DEVICE — APPLICATOR SURGICEL LAP TROCAR POINT 2.5MM X 150MM

## (undated) DEVICE — XI DRAPE ARM

## (undated) DEVICE — DRSG DERMABOND 0.7ML

## (undated) DEVICE — POSITIONER FOAM EGG CRATE ULNAR 2PCS (PINK)

## (undated) DEVICE — DRAPE TOWEL BLUE 17" X 24"

## (undated) DEVICE — TUBING STRYKEFLOW II SUCTION / IRRIGATOR

## (undated) DEVICE — MARKING PEN W RULER

## (undated) DEVICE — Device

## (undated) DEVICE — SUT BIOSYN 4-0 18" P-12

## (undated) DEVICE — DRAPE 3/4 SHEET W REINFORCEMENT 56X77"

## (undated) DEVICE — SPECIMEN CONTAINER 100ML

## (undated) DEVICE — WARMING BLANKET LOWER ADULT

## (undated) DEVICE — DRAPE MAYO STAND 30"

## (undated) DEVICE — ELCTR BOVIE PENCIL SMOKE EVACUATION

## (undated) DEVICE — GOWN TRIMAX XXL

## (undated) DEVICE — D HELP - CLEARVIEW CLEARIFY SYSTEM

## (undated) DEVICE — SOL IRR POUR H2O 250ML

## (undated) DEVICE — SOL IRR POUR NS 0.9% 500ML

## (undated) DEVICE — ENDOCATCH II 15MM

## (undated) DEVICE — SUT MAXON 0 30" GS-22

## (undated) DEVICE — SUT SOFSILK 2-0 18" TIES

## (undated) DEVICE — GLV 8.5 PROTEXIS (WHITE)

## (undated) DEVICE — WARMING BLANKET UPPER ADULT

## (undated) DEVICE — XI OBTURATOR OPTICAL BLADELESS 8MM

## (undated) DEVICE — PREP CHLORAPREP HI-LITE ORANGE 26ML

## (undated) DEVICE — FOLEY TRAY 16FR 5CC LF LUBRISIL ADVANCE TEMP CLOSED